# Patient Record
Sex: FEMALE | Race: WHITE | NOT HISPANIC OR LATINO | Employment: OTHER | URBAN - METROPOLITAN AREA
[De-identification: names, ages, dates, MRNs, and addresses within clinical notes are randomized per-mention and may not be internally consistent; named-entity substitution may affect disease eponyms.]

---

## 2017-10-10 ENCOUNTER — TELEPHONE (OUTPATIENT)
Dept: HEMATOLOGY/ONCOLOGY | Facility: CLINIC | Age: 62
End: 2017-10-10

## 2017-10-10 NOTE — TELEPHONE ENCOUNTER
----- Message from Shayy Alicea sent at 10/10/2017  2:07 PM CDT -----  Patient stated that CT of ABD/PEL was to be done at [facility] however they are out of network with her insurance. She said she has to have it done at [facility]. Can she please get orders for this. She had to move her appt due to insurance also going to be up 11/1/17, so I had to put her on for 10/31/17. Please advise and call patient at [phone]. Thanks

## 2017-10-20 ENCOUNTER — TELEPHONE (OUTPATIENT)
Dept: HEMATOLOGY/ONCOLOGY | Facility: CLINIC | Age: 62
End: 2017-10-20

## 2017-10-20 NOTE — TELEPHONE ENCOUNTER
Spoke to patient. I told her scan appears to be unremarkable.She will keep f/u with  on 10/31 to go over results in detail

## 2017-10-31 ENCOUNTER — OFFICE VISIT (OUTPATIENT)
Dept: HEMATOLOGY/ONCOLOGY | Facility: CLINIC | Age: 62
End: 2017-10-31
Payer: COMMERCIAL

## 2017-10-31 VITALS
HEART RATE: 74 BPM | DIASTOLIC BLOOD PRESSURE: 71 MMHG | SYSTOLIC BLOOD PRESSURE: 102 MMHG | HEIGHT: 65 IN | BODY MASS INDEX: 26.21 KG/M2 | RESPIRATION RATE: 18 BRPM | WEIGHT: 157.31 LBS | TEMPERATURE: 98 F

## 2017-10-31 DIAGNOSIS — C49.A3 MALIGNANT GASTROINTESTINAL STROMAL TUMOR (GIST) OF SMALL INTESTINE: ICD-10-CM

## 2017-10-31 PROCEDURE — 99213 OFFICE O/P EST LOW 20 MIN: CPT | Mod: ,,, | Performed by: INTERNAL MEDICINE

## 2017-10-31 RX ORDER — IVERMECTIN 10 MG/G
1 CREAM TOPICAL
COMMUNITY
End: 2019-08-06

## 2017-10-31 RX ORDER — PANTOPRAZOLE SODIUM 40 MG/1
40 TABLET, DELAYED RELEASE ORAL
COMMUNITY
End: 2024-02-05

## 2017-10-31 RX ORDER — FLUTICASONE PROPIONATE 50 MCG
1 SPRAY, SUSPENSION (ML) NASAL
COMMUNITY
End: 2022-03-09

## 2017-10-31 RX ORDER — CALCIPOTRIENE, BETAMETHASONE DIPROPIONATE 50; .643 UG/G; MG/G
OINTMENT TOPICAL
COMMUNITY
End: 2018-04-18

## 2017-10-31 RX ORDER — CALCIPOTRIENE AND BETAMETHASONE DIPROPIONATE 50; .5 UG/G; MG/G
SUSPENSION TOPICAL
COMMUNITY
End: 2019-08-06

## 2017-10-31 RX ORDER — LIFITEGRAST 50 MG/ML
SOLUTION/ DROPS OPHTHALMIC
Refills: 6 | COMMUNITY
Start: 2017-08-01 | End: 2019-08-06

## 2017-10-31 RX ORDER — CEFDINIR 300 MG/1
300 CAPSULE ORAL
COMMUNITY
End: 2018-04-18

## 2017-10-31 RX ORDER — BENZONATATE 100 MG/1
100 CAPSULE ORAL
COMMUNITY
End: 2019-08-06

## 2017-10-31 RX ORDER — MULTIVITAMIN
1 TABLET ORAL
COMMUNITY

## 2017-10-31 RX ORDER — CYCLOBENZAPRINE HCL 10 MG
10 TABLET ORAL
COMMUNITY
End: 2018-10-15

## 2017-10-31 RX ORDER — LORATADINE 10 MG/1
10 TABLET ORAL
COMMUNITY
End: 2022-06-14

## 2017-10-31 RX ORDER — ERGOCALCIFEROL 1.25 MG/1
50000 CAPSULE ORAL
Refills: 3 | COMMUNITY
Start: 2017-09-06 | End: 2019-08-06

## 2017-10-31 NOTE — PROGRESS NOTES
PROGRESS NOTE    Subjective:       Patient ID: Dai Cummings is a 61 y.o. female.    Chief Complaint:  No chief complaint on file.  follow up for GIST    History of Present Illness:   Dai Cummings is a 61 y.o. female who presents for routine follow up of GIST tumor.  Patient complaining of a 2 day history of intermittent soreness in there right flank area/RLQ.  Patient also very fatigued over the last few weeks.  No associated pain, has not tried any medications.        Family and Social history reviewed and is unchanged from 8/12/2013      ROS:  Review of Systems   Constitutional: Negative for fever.   Respiratory: Negative for shortness of breath.    Cardiovascular: Negative for chest pain and leg swelling.   Gastrointestinal: Negative for abdominal pain and blood in stool.   Genitourinary: Negative for hematuria.   Skin: Negative for rash.          Current Outpatient Prescriptions:     apremilast 30 mg Tab, Take 1 each by mouth., Disp: , Rfl:     benzonatate (TESSALON) 100 MG capsule, Take 100 mg by mouth., Disp: , Rfl:     calcipotriene-betamethasone (TACLONEX SCALP) external suspension, Apply topically., Disp: , Rfl:     calcipotriene-betamethasone (TACLONEX) ointment, Apply topically., Disp: , Rfl:     cefdinir (OMNICEF) 300 MG capsule, Take 300 mg by mouth., Disp: , Rfl:     cyclobenzaprine (FLEXERIL) 10 MG tablet, Take 10 mg by mouth., Disp: , Rfl:     cyclosporine 0.05 % Drop, 1 drop., Disp: , Rfl:     diphenhyd-PE-acetaminophen 12.5-5-325 mg Tab, Take 1 each by mouth., Disp: , Rfl:     ergocalciferol (ERGOCALCIFEROL) 50,000 unit Cap, Take 50,000 Units by mouth every 7 days., Disp: , Rfl: 3    fluticasone (FLONASE) 50 mcg/actuation nasal spray, 1 spray by Nasal route., Disp: , Rfl:     ivermectin 1 % Crea, Apply 1 each topically., Disp: , Rfl:     loratadine (CLARITIN) 10 mg tablet, Take 10 mg by mouth., Disp: , Rfl:      "loratadine-pseudoephedrine 5-120 mg (CLARITIN-D 12 HOUR) 5-120 mg per tablet, Take by mouth., Disp: , Rfl:     menthol 10 % Spry, Apply topically., Disp: , Rfl:     multivitamin (THERAGRAN) per tablet, Take 1 tablet by mouth., Disp: , Rfl:     pantoprazole (PROTONIX) 40 MG tablet, Take 40 mg by mouth., Disp: , Rfl:     POLYETHYLENE GLYCOL 3350 ORAL, Take by mouth., Disp: , Rfl:     ranitidine (ZANTAC) 300 MG tablet, Take 300 mg by mouth every evening., Disp: , Rfl: 4    XIIDRA 5 % Dpet, INSTILL 1 DROP INTO EACH EYE TWICE A DAY, Disp: , Rfl: 6        Objective:       Physical Examination:     /71   Pulse 74   Temp 97.8 °F (36.6 °C)   Resp 18   Ht 5' 5" (1.651 m)   Wt 71.4 kg (157 lb 4.8 oz)   BMI 26.18 kg/m²     Physical Exam   Constitutional: She appears well-developed and well-nourished.   HENT:   Head: Normocephalic and atraumatic.   Right Ear: External ear normal.   Left Ear: External ear normal.   Mouth/Throat: Oropharynx is clear and moist.   Eyes: Conjunctivae are normal. Pupils are equal, round, and reactive to light.   Neck: No tracheal deviation present. No thyromegaly present.   Cardiovascular: Normal rate, regular rhythm and normal heart sounds.    Pulmonary/Chest: Effort normal and breath sounds normal.   Abdominal: Soft. Bowel sounds are normal. She exhibits no distension and no mass. There is no tenderness.   Musculoskeletal: She exhibits no edema.   Neurological:   Neuro intact througout   Skin: No rash noted.   Psychiatric: She has a normal mood and affect. Her behavior is normal. Judgment and thought content normal.       Labs:   No results found for this or any previous visit (from the past 336 hour(s)).  CMP  No results found for: NA, K, CL, CO2, GLU, BUN, CREATININE, CALCIUM, PROT, ALBUMIN, BILITOT, ALKPHOS, AST, ALT, ANIONGAP, ESTGFRAFRICA, EGFRNONAA  No results found for: CEA  No results found for: PSA        Assessment/Plan:     Problem List Items Addressed This Visit     " Malignant gastrointestinal stromal tumor (GIST) of small intestine/pancreas     Patient with some abdominal symptoms but CT of the abdomen done 8/17 sees no clear abnormalities.  Would observe, may be GI related and will likely self resolve.  Patient to call if this does not occur.  Will see her again in 6 months and plan on scan again in one year.  Discussed all this in detail.                 Discussion:     Return in about 6 months (around 4/30/2018).      Electronically signed by John Díaz

## 2017-10-31 NOTE — ASSESSMENT & PLAN NOTE
Patient with some abdominal symptoms but CT of the abdomen done 8/17 sees no clear abnormalities.  Would observe, may be GI related and will likely self resolve.  Patient to call if this does not occur.  Will see her again in 6 months and plan on scan again in one year.  Discussed all this in detail.

## 2018-04-18 ENCOUNTER — OFFICE VISIT (OUTPATIENT)
Dept: HEMATOLOGY/ONCOLOGY | Facility: CLINIC | Age: 63
End: 2018-04-18
Payer: COMMERCIAL

## 2018-04-18 VITALS
RESPIRATION RATE: 18 BRPM | HEART RATE: 65 BPM | HEIGHT: 65 IN | TEMPERATURE: 98 F | WEIGHT: 168.69 LBS | DIASTOLIC BLOOD PRESSURE: 76 MMHG | SYSTOLIC BLOOD PRESSURE: 113 MMHG | BODY MASS INDEX: 28.11 KG/M2

## 2018-04-18 DIAGNOSIS — C49.A3 MALIGNANT GASTROINTESTINAL STROMAL TUMOR (GIST) OF SMALL INTESTINE: ICD-10-CM

## 2018-04-18 PROCEDURE — 99213 OFFICE O/P EST LOW 20 MIN: CPT | Mod: ,,, | Performed by: INTERNAL MEDICINE

## 2018-04-18 NOTE — PROGRESS NOTES
"                                                         PROGRESS NOTE    Subjective:       Patient ID: Dai Cummings is a 62 y.o. female.    Chief Complaint:  No chief complaint on file.  follow up for GIST    History of Present Illness:   Dai Cummings is a 62 y.o. female who presents for routine follow up of GIST tumor. Patient still has muscle pain and soreness and states that "I don't feel right".      Family and Social history reviewed and is unchanged from 8/12/2013      ROS:  Review of Systems   Constitutional: Negative for fever.   Respiratory: Negative for shortness of breath.    Cardiovascular: Negative for chest pain and leg swelling.   Gastrointestinal: Negative for abdominal pain and blood in stool.   Genitourinary: Negative for hematuria.   Skin: Negative for rash.          Current Outpatient Prescriptions:     cyclobenzaprine (FLEXERIL) 10 MG tablet, Take 10 mg by mouth., Disp: , Rfl:     fluticasone (FLONASE) 50 mcg/actuation nasal spray, 1 spray by Nasal route., Disp: , Rfl:     loratadine (CLARITIN) 10 mg tablet, Take 10 mg by mouth., Disp: , Rfl:     loratadine-pseudoephedrine 5-120 mg (CLARITIN-D 12 HOUR) 5-120 mg per tablet, Take by mouth., Disp: , Rfl:     menthol 10 % Spry, Apply topically., Disp: , Rfl:     multivitamin (THERAGRAN) per tablet, Take 1 tablet by mouth., Disp: , Rfl:     pantoprazole (PROTONIX) 40 MG tablet, Take 40 mg by mouth., Disp: , Rfl:     POLYETHYLENE GLYCOL 3350 ORAL, Take by mouth., Disp: , Rfl:     ranitidine (ZANTAC) 300 MG tablet, Take 300 mg by mouth every evening., Disp: , Rfl: 4    XIIDRA 5 % Dpet, INSTILL 1 DROP INTO EACH EYE TWICE A DAY, Disp: , Rfl: 6    benzonatate (TESSALON) 100 MG capsule, Take 100 mg by mouth., Disp: , Rfl:     calcipotriene-betamethasone (TACLONEX SCALP) external suspension, Apply topically., Disp: , Rfl:     cyclosporine 0.05 % Drop, 1 drop., Disp: , Rfl:     ergocalciferol (ERGOCALCIFEROL) 50,000 unit Cap, Take 50,000 " "Units by mouth every 7 days., Disp: , Rfl: 3    ivermectin 1 % Crea, Apply 1 each topically., Disp: , Rfl:         Objective:       Physical Examination:     /76   Pulse 65   Temp 98 °F (36.7 °C) (Oral)   Resp 18   Ht 5' 5" (1.651 m)   Wt 76.5 kg (168 lb 11.2 oz)   BMI 28.07 kg/m²     Physical Exam   Constitutional: She appears well-developed and well-nourished.   HENT:   Head: Normocephalic and atraumatic.   Right Ear: External ear normal.   Left Ear: External ear normal.   Mouth/Throat: Oropharynx is clear and moist.   Eyes: Conjunctivae are normal. Pupils are equal, round, and reactive to light.   Neck: No tracheal deviation present. No thyromegaly present.   Cardiovascular: Normal rate, regular rhythm and normal heart sounds.    Pulmonary/Chest: Effort normal and breath sounds normal.   Abdominal: Soft. Bowel sounds are normal. She exhibits no distension and no mass. There is no tenderness.   Musculoskeletal: She exhibits no edema.   Neurological:   Neuro intact througout   Skin: No rash noted.   Psychiatric: She has a normal mood and affect. Her behavior is normal. Judgment and thought content normal.       Labs:   No results found for this or any previous visit (from the past 336 hour(s)).  CMP  No results found for: NA, K, CL, CO2, GLU, BUN, CREATININE, CALCIUM, PROT, ALBUMIN, BILITOT, ALKPHOS, AST, ALT, ANIONGAP, ESTGFRAFRICA, EGFRNONAA  No results found for: CEA  No results found for: PSA        Assessment/Plan:     Problem List Items Addressed This Visit     Malignant gastrointestinal stromal tumor (GIST) of small intestine/pancreas     Patient having similar symptoms as last fall when scans were done.  I think this may be more of her fibromyalgia issues then related to recurrent GIST.  Plan is to scan in six months and I think this is appropriate.  I offered to scan now but she is ok waiting on this.  Abdominal exam is completely benign.  Discussed all this in detail.  Will plan scans for six " months.          Relevant Orders    CBC auto differential    Comprehensive metabolic panel    CT Abdomen Pelvis With Contrast    X-Ray Chest PA And Lateral          Discussion:     Follow-up in about 6 months (around 10/18/2018).      Electronically signed by John Díaz

## 2018-04-18 NOTE — ASSESSMENT & PLAN NOTE
Patient having similar symptoms as last fall when scans were done.  I think this may be more of her fibromyalgia issues then related to recurrent GIST.  Plan is to scan in six months and I think this is appropriate.  I offered to scan now but she is ok waiting on this.  Abdominal exam is completely benign.  Discussed all this in detail.  Will plan scans for six months.

## 2018-10-15 ENCOUNTER — OFFICE VISIT (OUTPATIENT)
Dept: HEMATOLOGY/ONCOLOGY | Facility: CLINIC | Age: 63
End: 2018-10-15
Payer: COMMERCIAL

## 2018-10-15 VITALS
WEIGHT: 164 LBS | BODY MASS INDEX: 27.29 KG/M2 | DIASTOLIC BLOOD PRESSURE: 66 MMHG | RESPIRATION RATE: 18 BRPM | TEMPERATURE: 98 F | SYSTOLIC BLOOD PRESSURE: 101 MMHG | HEART RATE: 73 BPM

## 2018-10-15 DIAGNOSIS — C49.A3 MALIGNANT GASTROINTESTINAL STROMAL TUMOR (GIST) OF SMALL INTESTINE: ICD-10-CM

## 2018-10-15 PROCEDURE — 3008F BODY MASS INDEX DOCD: CPT | Mod: ,,, | Performed by: INTERNAL MEDICINE

## 2018-10-15 PROCEDURE — 99213 OFFICE O/P EST LOW 20 MIN: CPT | Mod: ,,, | Performed by: INTERNAL MEDICINE

## 2018-10-15 NOTE — ASSESSMENT & PLAN NOTE
Patient is doing well at this time and appears MICHAEL.  Will arrange to have imaging done and can call the patient with these results.  Exam is negative and she has no signs or symptoms of current problems.  Will see her again in six months.

## 2018-10-15 NOTE — PROGRESS NOTES
PROGRESS NOTE    Subjective:       Patient ID: Dai Cummings is a 62 y.o. female.    Chief Complaint:  No chief complaint on file.  follow up for GIST    History of Present Illness:   Dai Cummings is a 62 y.o. female who presents for routine follow up of GIST tumor. NO new complaints at this time but forgot to get CT scans ordered.      Family and Social history reviewed and is unchanged from 8/12/2013      ROS:  Review of Systems   Constitutional: Negative for fever.   Respiratory: Negative for shortness of breath.    Cardiovascular: Negative for chest pain and leg swelling.   Gastrointestinal: Negative for abdominal pain and blood in stool.   Genitourinary: Negative for hematuria.   Skin: Negative for rash.          Current Outpatient Medications:     benzonatate (TESSALON) 100 MG capsule, Take 100 mg by mouth., Disp: , Rfl:     calcipotriene-betamethasone (TACLONEX SCALP) external suspension, Apply topically., Disp: , Rfl:     cyclosporine 0.05 % Drop, 1 drop., Disp: , Rfl:     ergocalciferol (ERGOCALCIFEROL) 50,000 unit Cap, Take 50,000 Units by mouth every 7 days., Disp: , Rfl: 3    fluticasone (FLONASE) 50 mcg/actuation nasal spray, 1 spray by Nasal route., Disp: , Rfl:     ivermectin 1 % Crea, Apply 1 each topically., Disp: , Rfl:     loratadine (CLARITIN) 10 mg tablet, Take 10 mg by mouth., Disp: , Rfl:     loratadine-pseudoephedrine 5-120 mg (CLARITIN-D 12 HOUR) 5-120 mg per tablet, Take by mouth., Disp: , Rfl:     menthol 10 % Spry, Apply topically., Disp: , Rfl:     multivitamin (THERAGRAN) per tablet, Take 1 tablet by mouth., Disp: , Rfl:     pantoprazole (PROTONIX) 40 MG tablet, Take 40 mg by mouth., Disp: , Rfl:     POLYETHYLENE GLYCOL 3350 ORAL, Take by mouth., Disp: , Rfl:     ranitidine (ZANTAC) 300 MG tablet, Take 300 mg by mouth every evening., Disp: , Rfl: 4    XIIDRA 5 % Dpet, INSTILL 1 DROP INTO EACH EYE TWICE A DAY,  Disp: , Rfl: 6        Objective:       Physical Examination:     /66   Pulse 73   Temp 98 °F (36.7 °C) (Oral)   Resp 18   Wt 74.4 kg (164 lb)   BMI 27.29 kg/m²     Physical Exam   Constitutional: She appears well-developed and well-nourished.   HENT:   Head: Normocephalic and atraumatic.   Right Ear: External ear normal.   Left Ear: External ear normal.   Mouth/Throat: Oropharynx is clear and moist.   Eyes: Conjunctivae are normal. Pupils are equal, round, and reactive to light.   Neck: No tracheal deviation present. No thyromegaly present.   Cardiovascular: Normal rate, regular rhythm and normal heart sounds.   Pulmonary/Chest: Effort normal and breath sounds normal.   Abdominal: Soft. Bowel sounds are normal. She exhibits no distension and no mass. There is no tenderness.   Musculoskeletal: She exhibits no edema.   Neurological:   Neuro intact througout   Skin: No rash noted.   Psychiatric: She has a normal mood and affect. Her behavior is normal. Judgment and thought content normal.       Labs:   No results found for this or any previous visit (from the past 336 hour(s)).  CMP  No results found for: NA, K, CL, CO2, GLU, BUN, CREATININE, CALCIUM, PROT, ALBUMIN, BILITOT, ALKPHOS, AST, ALT, ANIONGAP, ESTGFRAFRICA, EGFRNONAA  No results found for: CEA  No results found for: PSA        Assessment/Plan:     Problem List Items Addressed This Visit     Malignant gastrointestinal stromal tumor (GIST) of small intestine/pancreas     Patient is doing well at this time and appears MICHAEL.  Will arrange to have imaging done and can call the patient with these results.  Exam is negative and she has no signs or symptoms of current problems.  Will see her again in six months.                 Discussion:     Follow-up in about 6 months (around 4/15/2019).      Electronically signed by John íDaz

## 2018-11-08 LAB — ISTAT CREATININE: 0.5 MG/DL (ref 0.6–1.4)

## 2019-08-06 ENCOUNTER — OFFICE VISIT (OUTPATIENT)
Dept: HEMATOLOGY/ONCOLOGY | Facility: CLINIC | Age: 64
End: 2019-08-06
Payer: COMMERCIAL

## 2019-08-06 DIAGNOSIS — C49.A3 MALIGNANT GASTROINTESTINAL STROMAL TUMOR (GIST) OF SMALL INTESTINE: ICD-10-CM

## 2019-08-06 DIAGNOSIS — F51.01 PRIMARY INSOMNIA: ICD-10-CM

## 2019-08-06 PROCEDURE — 99213 OFFICE O/P EST LOW 20 MIN: CPT | Mod: S$GLB,,, | Performed by: INTERNAL MEDICINE

## 2019-08-06 PROCEDURE — 99213 PR OFFICE/OUTPT VISIT, EST, LEVL III, 20-29 MIN: ICD-10-PCS | Mod: S$GLB,,, | Performed by: INTERNAL MEDICINE

## 2019-08-06 RX ORDER — ZOLPIDEM TARTRATE 5 MG/1
5 TABLET ORAL NIGHTLY PRN
Qty: 30 TABLET | Refills: 3 | Status: SHIPPED | OUTPATIENT
Start: 2019-08-06 | End: 2019-12-03 | Stop reason: SDUPTHER

## 2019-08-06 RX ORDER — ACETAMINOPHEN AND PHENYLEPHRINE HCL 325; 5 MG/1; MG/1
TABLET ORAL
COMMUNITY

## 2019-08-06 RX ORDER — CALCIUM CARBONATE 600 MG
600 TABLET ORAL ONCE
COMMUNITY

## 2019-08-06 NOTE — PROGRESS NOTES
PROGRESS NOTE    Subjective:       Patient ID: Dai Cummings is a 63 y.o. female.    Chief Complaint:  No chief complaint on file.  follow up for GIST    History of Present Illness:   Dai Cummings is a 63 y.o. female who presents for routine follow up of GIST tumor.           CT abd/p 11/8/2018 impression:  IMPRESSION:  1. Stable 3 cm enhancing mass with internal calcification adjacent to the  pancreas, compatible with known gastrointestinal stromal tumor.  2. No findings of metastatic disease in the abdomen or the pelvis.  3. Left mid abdominal small bowel intussusception, which is very likely  transient and of doubtful clinical significance.  4. Additional stable observations as above.      Family and Social history reviewed and is unchanged from 8/12/2013      ROS:  Review of Systems   Constitutional: Negative for fever.   Respiratory: Negative for shortness of breath.    Cardiovascular: Negative for chest pain and leg swelling.   Gastrointestinal: Negative for abdominal pain and blood in stool.   Genitourinary: Negative for hematuria.   Skin: Negative for rash.          Current Outpatient Medications:     biotin 10,000 mcg Cap, Take by mouth., Disp: , Rfl:     calcium carbonate (OS-SHAYAN) 600 mg calcium (1,500 mg) Tab, Take 600 mg by mouth once., Disp: , Rfl:     cholecalciferol, vitamin D3, (VITAMIN D3 ORAL), Take by mouth., Disp: , Rfl:     fluticasone (FLONASE) 50 mcg/actuation nasal spray, 1 spray by Nasal route., Disp: , Rfl:     loratadine (CLARITIN) 10 mg tablet, Take 10 mg by mouth., Disp: , Rfl:     loratadine-pseudoephedrine 5-120 mg (CLARITIN-D 12 HOUR) 5-120 mg per tablet, Take by mouth., Disp: , Rfl:     multivitamin (THERAGRAN) per tablet, Take 1 tablet by mouth., Disp: , Rfl:     pantoprazole (PROTONIX) 40 MG tablet, Take 40 mg by mouth., Disp: , Rfl:     polyethylene glycol 3350 (MIRALAX ORAL), Take by mouth., Disp: , Rfl:      ranitidine (ZANTAC) 300 MG tablet, Take 300 mg by mouth every evening., Disp: , Rfl: 4    zolpidem (AMBIEN) 5 MG Tab, Take 1 tablet (5 mg total) by mouth nightly as needed., Disp: 30 tablet, Rfl: 3        Objective:       Physical Examination:     There were no vitals taken for this visit.    Physical Exam   Constitutional: She appears well-developed and well-nourished.   HENT:   Head: Normocephalic and atraumatic.   Right Ear: External ear normal.   Left Ear: External ear normal.   Mouth/Throat: Oropharynx is clear and moist.   Eyes: Pupils are equal, round, and reactive to light. Conjunctivae are normal.   Neck: No tracheal deviation present. No thyromegaly present.   Cardiovascular: Normal rate, regular rhythm and normal heart sounds.   Pulmonary/Chest: Effort normal and breath sounds normal.   Abdominal: Soft. Bowel sounds are normal. She exhibits no distension and no mass. There is no tenderness.   Musculoskeletal: She exhibits no edema.   Neurological:   Neuro intact througout   Skin: No rash noted.   Psychiatric: She has a normal mood and affect. Her behavior is normal. Judgment and thought content normal.       Labs:   No results found for this or any previous visit (from the past 336 hour(s)).  CMP  No results found for: NA, K, CL, CO2, GLU, BUN, CREATININE, CALCIUM, PROT, ALBUMIN, BILITOT, ALKPHOS, AST, ALT, ANIONGAP, ESTGFRAFRICA, EGFRNONAA  No results found for: CEA  No results found for: PSA        Assessment/Plan:     Problem List Items Addressed This Visit     Primary insomnia     Patient having more difficulties with this.  Will try her on Ambien and see how this helps.           Relevant Medications    zolpidem (AMBIEN) 5 MG Tab    Malignant gastrointestinal stromal tumor (GIST) of small intestine/pancreas     Patient has no clear evidence of recurrence at this point but still has rather severe RUQ pain and bowel issues since the surgery in 2011.  She has seen GI but I have no notes from them.  My  plan is to get another scan in the next few months to investigate this issue and for new disease and discussed this today.           Relevant Orders    CT Abdomen Pelvis With Contrast    Creatinine, serum          Discussion:     Follow up in about 3 months (around 11/6/2019).      Electronically signed by John Díaz

## 2019-08-06 NOTE — ASSESSMENT & PLAN NOTE
Patient has no clear evidence of recurrence at this point but still has rather severe RUQ pain and bowel issues since the surgery in 2011.  She has seen GI but I have no notes from them.  My plan is to get another scan in the next few months to investigate this issue and for new disease and discussed this today.

## 2019-11-01 ENCOUNTER — HOSPITAL ENCOUNTER (OUTPATIENT)
Dept: RADIOLOGY | Facility: HOSPITAL | Age: 64
Discharge: HOME | End: 2019-11-01
Attending: INTERNAL MEDICINE
Payer: COMMERCIAL

## 2019-11-01 DIAGNOSIS — C49.A3 MALIGNANT GASTROINTESTINAL STROMAL TUMOR (GIST) OF SMALL INTESTINE: ICD-10-CM

## 2019-11-01 LAB
CREAT SERPL-MCNC: 0.5 MG/DL (ref 0.5–1.4)
SAMPLE: NORMAL

## 2019-11-01 PROCEDURE — 25500020 PHARM REV CODE 255: Performed by: INTERNAL MEDICINE

## 2019-11-01 PROCEDURE — 74178 CT ABD&PLV WO CNTR FLWD CNTR: CPT | Mod: TC

## 2019-11-01 RX ADMIN — IOHEXOL 100 ML: 350 INJECTION, SOLUTION INTRAVENOUS at 01:11

## 2019-11-05 ENCOUNTER — OFFICE VISIT (OUTPATIENT)
Dept: HEMATOLOGY/ONCOLOGY | Facility: CLINIC | Age: 64
End: 2019-11-05
Payer: COMMERCIAL

## 2019-11-05 VITALS
TEMPERATURE: 98 F | RESPIRATION RATE: 19 BRPM | WEIGHT: 163.38 LBS | HEART RATE: 80 BPM | BODY MASS INDEX: 27.19 KG/M2 | SYSTOLIC BLOOD PRESSURE: 112 MMHG | DIASTOLIC BLOOD PRESSURE: 76 MMHG

## 2019-11-05 DIAGNOSIS — C49.A3 MALIGNANT GASTROINTESTINAL STROMAL TUMOR (GIST) OF SMALL INTESTINE: ICD-10-CM

## 2019-11-05 PROCEDURE — 3008F BODY MASS INDEX DOCD: CPT | Mod: S$GLB,,, | Performed by: INTERNAL MEDICINE

## 2019-11-05 PROCEDURE — 3008F PR BODY MASS INDEX (BMI) DOCUMENTED: ICD-10-PCS | Mod: S$GLB,,, | Performed by: INTERNAL MEDICINE

## 2019-11-05 PROCEDURE — 99213 PR OFFICE/OUTPT VISIT, EST, LEVL III, 20-29 MIN: ICD-10-PCS | Mod: S$GLB,,, | Performed by: INTERNAL MEDICINE

## 2019-11-05 PROCEDURE — 99213 OFFICE O/P EST LOW 20 MIN: CPT | Mod: S$GLB,,, | Performed by: INTERNAL MEDICINE

## 2019-11-05 NOTE — ASSESSMENT & PLAN NOTE
Patient returns with another CT in the abdomen.  The CT scan shows a 3cm mass in the head of the pancreas.  This is unchanged from last year and I discussed this is unlike a cancer Dx and may be more likely a scarring issue.  I will have her see Dr. Arroyo to see if EUS is a reasonable approach here and discussed this today.  Will have her back with me after this procedure is done.

## 2019-11-05 NOTE — PROGRESS NOTES
PROGRESS NOTE    Subjective:       Patient ID: Dai Cummings is a 63 y.o. female.    Chief Complaint:  No chief complaint on file.  follow up for GIST    History of Present Illness:   Dai Cummings is a 63 y.o. female who presents for routine follow up of GIST tumor.       Ct Abd/P Impression 11/1/2019:  1. Stable size and appearance of 3 cm mass along the anterior aspect of the pancreatic head, consistent with known gastrointestinal stromal tumor.  2. No findings of metastatic disease in the abdomen or pelvis.      CT abd/p 11/8/2018 impression:  IMPRESSION:  1. Stable 3 cm enhancing mass with internal calcification adjacent to the  pancreas, compatible with known gastrointestinal stromal tumor.  2. No findings of metastatic disease in the abdomen or the pelvis.  3. Left mid abdominal small bowel intussusception, which is very likely  transient and of doubtful clinical significance.  4. Additional stable observations as above.      Family and Social history reviewed and is unchanged from 8/12/2013      ROS:  Review of Systems   Constitutional: Negative for appetite change, fever and unexpected weight change.   Eyes: Negative for visual disturbance.   Respiratory: Negative for cough and shortness of breath.    Cardiovascular: Negative for chest pain and leg swelling.   Gastrointestinal: Negative for abdominal pain, blood in stool and diarrhea.   Genitourinary: Negative for frequency and hematuria.   Musculoskeletal: Positive for back pain.   Skin: Negative for rash.   Neurological: Negative for headaches.   Hematological: Negative for adenopathy.   Psychiatric/Behavioral: The patient is not nervous/anxious.           Current Outpatient Medications:     biotin 10,000 mcg Cap, Take by mouth., Disp: , Rfl:     calcium carbonate (OS-SHAYAN) 600 mg calcium (1,500 mg) Tab, Take 600 mg by mouth once., Disp: , Rfl:     cholecalciferol, vitamin D3, (VITAMIN D3  ORAL), Take by mouth., Disp: , Rfl:     fluticasone (FLONASE) 50 mcg/actuation nasal spray, 1 spray by Nasal route., Disp: , Rfl:     loratadine (CLARITIN) 10 mg tablet, Take 10 mg by mouth., Disp: , Rfl:     loratadine-pseudoephedrine 5-120 mg (CLARITIN-D 12 HOUR) 5-120 mg per tablet, Take by mouth., Disp: , Rfl:     multivitamin (THERAGRAN) per tablet, Take 1 tablet by mouth., Disp: , Rfl:     pantoprazole (PROTONIX) 40 MG tablet, Take 40 mg by mouth., Disp: , Rfl:     polyethylene glycol 3350 (MIRALAX ORAL), Take by mouth., Disp: , Rfl:     ranitidine (ZANTAC) 300 MG tablet, Take 300 mg by mouth every evening., Disp: , Rfl: 4    zolpidem (AMBIEN) 5 MG Tab, Take 1 tablet (5 mg total) by mouth nightly as needed., Disp: 30 tablet, Rfl: 3        Objective:       Physical Examination:     /76   Pulse 80   Temp 98.2 °F (36.8 °C) (Oral)   Resp 19   Wt 74.1 kg (163 lb 6.4 oz)   BMI 27.19 kg/m²     Physical Exam   Constitutional: She appears well-developed and well-nourished.   HENT:   Head: Normocephalic and atraumatic.   Right Ear: External ear normal.   Left Ear: External ear normal.   Mouth/Throat: Oropharynx is clear and moist.   Eyes: Pupils are equal, round, and reactive to light. Conjunctivae are normal.   Neck: No tracheal deviation present. No thyromegaly present.   Cardiovascular: Normal rate, regular rhythm and normal heart sounds.   Pulmonary/Chest: Effort normal and breath sounds normal.   Abdominal: Soft. Bowel sounds are normal. She exhibits no distension and no mass. There is no tenderness.   Musculoskeletal: She exhibits no edema.   Neurological:   Neuro intact througout   Skin: No rash noted.   Psychiatric: She has a normal mood and affect. Her behavior is normal. Judgment and thought content normal.       Labs:   No results found for this or any previous visit (from the past 336 hour(s)).  CMP  No results found for: NA, K, CL, CO2, GLU, BUN, CREATININE, CALCIUM, PROT, ALBUMIN,  BILITOT, ALKPHOS, AST, ALT, ANIONGAP, ESTGFRAFRICA, EGFRNONAA  No results found for: CEA  No results found for: PSA        Assessment/Plan:     Problem List Items Addressed This Visit     Malignant gastrointestinal stromal tumor (GIST) of small intestine/pancreas     Patient returns with another CT in the abdomen.  The CT scan shows a 3cm mass in the head of the pancreas.  This is unchanged from last year and I discussed this is unlike a cancer Dx and may be more likely a scarring issue.  I will have her see Dr. Arroyo to see if EUS is a reasonable approach here and discussed this today.  Will have her back with me after this procedure is done.                Discussion:     Follow up in about 4 weeks (around 12/3/2019).      Electronically signed by John Díaz

## 2019-11-07 ENCOUNTER — HOSPITAL ENCOUNTER (OUTPATIENT)
Dept: RADIOLOGY | Facility: HOSPITAL | Age: 64
Discharge: HOME | End: 2019-11-07
Attending: PHYSICIAN ASSISTANT
Payer: COMMERCIAL

## 2019-11-07 DIAGNOSIS — M54.16 LUMBAR RADICULOPATHY: Primary | ICD-10-CM

## 2019-11-07 DIAGNOSIS — M54.16 LUMBAR RADICULOPATHY: ICD-10-CM

## 2019-11-07 PROCEDURE — 72148 MRI LUMBAR SPINE W/O DYE: CPT | Mod: TC,PO

## 2019-12-03 ENCOUNTER — HOSPITAL ENCOUNTER (OUTPATIENT)
Facility: HOSPITAL | Age: 64
Discharge: HOME | End: 2019-12-03
Attending: INTERNAL MEDICINE | Admitting: INTERNAL MEDICINE
Payer: COMMERCIAL

## 2019-12-03 ENCOUNTER — ANESTHESIA (OUTPATIENT)
Dept: SURGERY | Facility: HOSPITAL | Age: 64
End: 2019-12-03
Payer: COMMERCIAL

## 2019-12-03 ENCOUNTER — ANESTHESIA EVENT (OUTPATIENT)
Dept: SURGERY | Facility: HOSPITAL | Age: 64
End: 2019-12-03
Payer: COMMERCIAL

## 2019-12-03 VITALS
WEIGHT: 158 LBS | HEIGHT: 65 IN | HEART RATE: 63 BPM | RESPIRATION RATE: 18 BRPM | TEMPERATURE: 98 F | OXYGEN SATURATION: 100 % | DIASTOLIC BLOOD PRESSURE: 81 MMHG | BODY MASS INDEX: 26.33 KG/M2 | SYSTOLIC BLOOD PRESSURE: 118 MMHG

## 2019-12-03 DIAGNOSIS — K86.89 PANCREATIC MASS: ICD-10-CM

## 2019-12-03 DIAGNOSIS — F51.01 PRIMARY INSOMNIA: ICD-10-CM

## 2019-12-03 DIAGNOSIS — Z01.818 PREOP TESTING: ICD-10-CM

## 2019-12-03 LAB
ANION GAP SERPL CALC-SCNC: 7 MMOL/L (ref 8–16)
BASOPHILS # BLD AUTO: 0.07 K/UL (ref 0–0.2)
BASOPHILS NFR BLD: 1 % (ref 0–1.9)
BUN SERPL-MCNC: 15 MG/DL (ref 8–23)
CALCIUM SERPL-MCNC: 8.8 MG/DL (ref 8.7–10.5)
CHLORIDE SERPL-SCNC: 105 MMOL/L (ref 95–110)
CO2 SERPL-SCNC: 28 MMOL/L (ref 23–29)
CREAT SERPL-MCNC: 0.5 MG/DL (ref 0.5–1.4)
DIFFERENTIAL METHOD: ABNORMAL
EOSINOPHIL # BLD AUTO: 0.2 K/UL (ref 0–0.5)
EOSINOPHIL NFR BLD: 2.5 % (ref 0–8)
ERYTHROCYTE [DISTWIDTH] IN BLOOD BY AUTOMATED COUNT: 12.7 % (ref 11.5–14.5)
EST. GFR  (AFRICAN AMERICAN): >60 ML/MIN/1.73 M^2
EST. GFR  (NON AFRICAN AMERICAN): >60 ML/MIN/1.73 M^2
GLUCOSE SERPL-MCNC: 89 MG/DL (ref 70–110)
HCT VFR BLD AUTO: 36.4 % (ref 37–48.5)
HGB BLD-MCNC: 11.9 G/DL (ref 12–16)
IMM GRANULOCYTES # BLD AUTO: 0.02 K/UL (ref 0–0.04)
IMM GRANULOCYTES NFR BLD AUTO: 0.3 % (ref 0–0.5)
LYMPHOCYTES # BLD AUTO: 1.1 K/UL (ref 1–4.8)
LYMPHOCYTES NFR BLD: 15.5 % (ref 18–48)
MCH RBC QN AUTO: 30.1 PG (ref 27–31)
MCHC RBC AUTO-ENTMCNC: 32.7 G/DL (ref 32–36)
MCV RBC AUTO: 92 FL (ref 82–98)
MONOCYTES # BLD AUTO: 0.5 K/UL (ref 0.3–1)
MONOCYTES NFR BLD: 7.1 % (ref 4–15)
NEUTROPHILS # BLD AUTO: 5.1 K/UL (ref 1.8–7.7)
NEUTROPHILS NFR BLD: 73.6 % (ref 38–73)
NRBC BLD-RTO: 0 /100 WBC
PLATELET # BLD AUTO: 300 K/UL (ref 150–350)
PMV BLD AUTO: 10.8 FL (ref 9.2–12.9)
POTASSIUM SERPL-SCNC: 3.8 MMOL/L (ref 3.5–5.1)
RBC # BLD AUTO: 3.95 M/UL (ref 4–5.4)
SODIUM SERPL-SCNC: 140 MMOL/L (ref 136–145)
WBC # BLD AUTO: 6.92 K/UL (ref 3.9–12.7)

## 2019-12-03 PROCEDURE — S0028 INJECTION, FAMOTIDINE, 20 MG: HCPCS | Performed by: STUDENT IN AN ORGANIZED HEALTH CARE EDUCATION/TRAINING PROGRAM

## 2019-12-03 PROCEDURE — 37000009 HC ANESTHESIA EA ADD 15 MINS: Performed by: INTERNAL MEDICINE

## 2019-12-03 PROCEDURE — 63600175 PHARM REV CODE 636 W HCPCS: Performed by: NURSE ANESTHETIST, CERTIFIED REGISTERED

## 2019-12-03 PROCEDURE — 80048 BASIC METABOLIC PNL TOTAL CA: CPT

## 2019-12-03 PROCEDURE — 43237 ENDOSCOPIC US EXAM ESOPH: CPT | Performed by: INTERNAL MEDICINE

## 2019-12-03 PROCEDURE — 85025 COMPLETE CBC W/AUTO DIFF WBC: CPT

## 2019-12-03 PROCEDURE — 25000003 PHARM REV CODE 250: Performed by: STUDENT IN AN ORGANIZED HEALTH CARE EDUCATION/TRAINING PROGRAM

## 2019-12-03 PROCEDURE — 37000008 HC ANESTHESIA 1ST 15 MINUTES: Performed by: INTERNAL MEDICINE

## 2019-12-03 RX ORDER — PROPOFOL 10 MG/ML
VIAL (ML) INTRAVENOUS
Status: DISCONTINUED | OUTPATIENT
Start: 2019-12-03 | End: 2019-12-03

## 2019-12-03 RX ORDER — FAMOTIDINE 10 MG/ML
20 INJECTION INTRAVENOUS 2 TIMES DAILY
Status: DISCONTINUED | OUTPATIENT
Start: 2019-12-03 | End: 2019-12-03 | Stop reason: HOSPADM

## 2019-12-03 RX ORDER — SODIUM CHLORIDE 9 MG/ML
INJECTION, SOLUTION INTRAVENOUS CONTINUOUS
Status: DISCONTINUED | OUTPATIENT
Start: 2019-12-03 | End: 2019-12-03 | Stop reason: HOSPADM

## 2019-12-03 RX ORDER — SODIUM CHLORIDE 9 MG/ML
INJECTION, SOLUTION INTRAVENOUS CONTINUOUS PRN
Status: DISCONTINUED | OUTPATIENT
Start: 2019-12-03 | End: 2019-12-03

## 2019-12-03 RX ORDER — ZOLPIDEM TARTRATE 5 MG/1
TABLET ORAL
Qty: 30 TABLET | Refills: 0 | Status: SHIPPED | OUTPATIENT
Start: 2019-12-03 | End: 2020-02-11 | Stop reason: SDUPTHER

## 2019-12-03 RX ORDER — SODIUM CHLORIDE 0.9 % (FLUSH) 0.9 %
2 SYRINGE (ML) INJECTION
Status: DISCONTINUED | OUTPATIENT
Start: 2019-12-03 | End: 2019-12-03 | Stop reason: HOSPADM

## 2019-12-03 RX ORDER — CYCLOSPORINE 0.5 MG/ML
1 EMULSION OPHTHALMIC 2 TIMES DAILY
COMMUNITY

## 2019-12-03 RX ADMIN — PROPOFOL 20 MG: 10 INJECTION, EMULSION INTRAVENOUS at 10:12

## 2019-12-03 RX ADMIN — PROPOFOL 50 MG: 10 INJECTION, EMULSION INTRAVENOUS at 10:12

## 2019-12-03 RX ADMIN — PROPOFOL 30 MG: 10 INJECTION, EMULSION INTRAVENOUS at 10:12

## 2019-12-03 RX ADMIN — PROPOFOL 70 MG: 10 INJECTION, EMULSION INTRAVENOUS at 10:12

## 2019-12-03 RX ADMIN — SODIUM CHLORIDE: 900 INJECTION INTRAVENOUS at 10:12

## 2019-12-03 RX ADMIN — PROPOFOL 40 MG: 10 INJECTION, EMULSION INTRAVENOUS at 10:12

## 2019-12-03 RX ADMIN — FAMOTIDINE 20 MG: 10 INJECTION, SOLUTION INTRAVENOUS at 10:12

## 2019-12-03 NOTE — TRANSFER OF CARE
"Anesthesia Transfer of Care Note    Patient: Dai Cummings    Procedure(s) Performed: Procedure(s) (LRB):  ULTRASOUND, UPPER GI TRACT, ENDOSCOPIC (N/A)    Patient location: GI    Anesthesia Type: MAC    Post pain: adequate analgesia    Post assessment: no apparent anesthetic complications    Post vital signs: stable    Level of consciousness: awake and alert    Nausea/Vomiting: no nausea/vomiting    Complications: none    Transfer of care protocol was followed      Last vitals:   Visit Vitals  /74 (BP Location: Left arm, Patient Position: Lying)   Pulse 75   Temp 36.6 °C (97.9 °F) (Oral)   Resp 15   Ht 5' 5" (1.651 m)   Wt 71.7 kg (158 lb)   SpO2 100%   Breastfeeding? No   BMI 26.29 kg/m²     "

## 2019-12-03 NOTE — [employer]OVATION PATIENT INSTRUCTIONS
Discharge Summary/Instructions after an Endoscopic Procedure  Patient Name: Dai Cummings  Patient MRN: [id number]  Patient YOB: 1955 Tuesday, December 03, 2019  Miguel Ángel Arroyo III, MD  RESTRICTIONS:  During your procedure today, you received medications for sedation.  These   medications may affect your judgment, balance and coordination.  Therefore,   for 24 hours, you have the following restrictions:   - DO NOT drive a car, operate machinery, make legal/financial decisions,   sign important papers or drink alcohol.    ACTIVITY:  Today: no heavy lifting, straining or running due to procedural   sedation/anesthesia.  The following day: return to full activity including work.  DIET:  Eat and drink normally unless instructed otherwise.     TREATMENT FOR COMMON SIDE EFFECTS:  - Mild abdominal pain, nausea, belching, bloating or excessive gas:  rest,   eat lightly and use a heating pad.  - Sore Throat: treat with throat lozenges and/or gargle with warm salt   water.  - Because air was used during the procedure, expelling large amounts of air   from your rectum or belching is normal.  - If a bowel prep was taken, you may not have a bowel movement for 1-3 days.    This is normal.  SYMPTOMS TO WATCH FOR AND REPORT TO YOUR PHYSICIAN:  1. Abdominal pain or bloating, other than gas cramps.  2. Chest pain.  3. Back pain.  4. Signs of infection such as: chills or fever occurring within 24 hours   after the procedure.  5. Rectal bleeding, which would show as bright red, maroon, or black stools.   (A tablespoon of blood from the rectum is not serious, especially if   hemorrhoids are present.)  6. Vomiting.  7. Weakness or dizziness.  GO DIRECTLY TO THE NEAREST EMERGENCY ROOM IF YOU HAVE ANY OF THE FOLLOWING:      Difficulty breathing              Chills and/or fever over 101 F   Persistent vomiting and/or vomiting blood   Severe abdominal pain   Severe chest pain   Black, tarry stools   Bleeding- more than one  tablespoon   Any other symptom or condition that you feel may need urgent attention  Your doctor recommends these additional instructions:  If any biopsies were taken, your doctors clinic will contact you in 1 to 2   weeks with any results.  - Discharge patient to home.   - Patient has a contact number available for emergencies.  The signs and   symptoms of potential delayed complications were discussed with the   patient.  Return to normal activities tomorrow.  Written discharge   instructions were provided to the patient.   - Resume regular diet.   - Continue present medications.   - Recommend surveillance per oncology discretion; if tissue absolutely   needed, would need IR vs surgical approach.  For questions, problems or results please call your physician - Miguel Ángel Arroyo III, MD at Work:  [phone].  [facility], EMERGENCY ROOM PHONE NUMBER: [phone]  IF A COMPLICATION OR EMERGENCY SITUATION ARISES AND YOU ARE UNABLE TO REACH   YOUR PHYSICIAN - GO DIRECTLY TO THE EMERGENCY ROOM.  Miguel Ángel Arroyo III, MD  12/3/2019 10:57:32 AM  This report has been verified and signed electronically.  [employer]

## 2019-12-03 NOTE — ANESTHESIA PREPROCEDURE EVALUATION
12/03/2019  Dai Cummings is a 63 y.o., female   Patient Active Problem List   Diagnosis    Malignant gastrointestinal stromal tumor (GIST) of small intestine/pancreas    Primary insomnia    Pancreatic mass       Past Surgical History:   Procedure Laterality Date    APPENDECTOMY      CATARACT EXTRACTION      both    CHOLECYSTECTOMY      DILATION AND CURETTAGE OF UTERUS      GASTRIC BYPASS      HERNIA REPAIR      HYSTERECTOMY      LAPAROSCOPIC GASTRIC BANDING      TONSILLECTOMY      WHIPPLE PROCEDURE          Tobacco Use:  The patient  reports that she has never smoked. She has never used smokeless tobacco.     No results found for this or any previous visit.          Lab Results   Component Value Date    WBC 6.92 12/03/2019    HGB 11.9 (L) 12/03/2019    HCT 36.4 (L) 12/03/2019    MCV 92 12/03/2019     12/03/2019     BMP  No results found for: NA, K, CL, CO2, BUN, CREATININE, CALCIUM, ANIONGAP, ESTGFRAFRICA, EGFRNONAA          Pre-op Assessment    I have reviewed the Patient Summary Reports.     I have reviewed the Nursing Notes.   I have reviewed the Medications.     Review of Systems  Anesthesia Hx:  No problems with previous Anesthesia  Denies Family Hx of Anesthesia complications.   Denies Personal Hx of Anesthesia complications.   Social:  Non-Smoker, No Alcohol Use    EENT/Dental:EENT/Dental Normal   Cardiovascular:  Cardiovascular Normal Exercise tolerance: good   Functional Capacity good / => 4 METS    Pulmonary:  Pulmonary Normal    Renal/:  Renal/ Normal     Hepatic/GI:   PUD, GERD GIST tumor, pancreatic mass   Neurological:  Neurology Normal    Endocrine:  Endocrine Normal        Physical Exam  General:  Well nourished    Airway/Jaw/Neck:  Airway Findings: Mouth Opening: Normal Mallampati: II  TM Distance: Normal, at least 6 cm  Jaw/Neck Findings:  Neck ROM: Normal ROM       Dental:  Dental Findings: In tact   Chest/Lungs:  Chest/Lungs Findings: Clear to auscultation, Normal Respiratory Rate     Heart/Vascular:  Heart Findings: Rate: Normal  Rhythm: Regular Rhythm  Sounds: Normal        Mental Status:  Mental Status Findings:  Cooperative, Alert and Oriented         Anesthesia Plan  Type of Anesthesia, risks & benefits discussed:  Anesthesia Type:  MAC  Patient's Preference:   Intra-op Monitoring Plan: standard ASA monitors  Intra-op Monitoring Plan Comments:   Post Op Pain Control Plan: per primary service following discharge from PACU  Post Op Pain Control Plan Comments:   Induction:    Beta Blocker:         Informed Consent: Patient understands risks and agrees with Anesthesia plan.  Questions answered. Anesthesia consent signed with patient.  ASA Score: 2     Day of Surgery Review of History & Physical:    H&P update referred to the surgeon.     Anesthesia Plan Notes: MAC with propofol. Pepcid.

## 2019-12-03 NOTE — ANESTHESIA POSTPROCEDURE EVALUATION
Anesthesia Post Evaluation    Patient: Dai Cummings    Procedure(s) Performed: Procedure(s) (LRB):  ULTRASOUND, UPPER GI TRACT, ENDOSCOPIC (N/A)    Final Anesthesia Type: MAC    Patient location during evaluation: GI PACU  Patient participation: Yes- Able to Participate  Level of consciousness: awake and alert and oriented  Post-procedure vital signs: reviewed and stable  Pain management: adequate  Airway patency: patent    PONV status at discharge: No PONV  Anesthetic complications: no      Cardiovascular status: blood pressure returned to baseline and hemodynamically stable  Respiratory status: unassisted, spontaneous ventilation and room air  Hydration status: euvolemic  Follow-up not needed.          Vitals Value Taken Time   /74 12/3/2019 10:52 AM   Temp 36.6 °C (97.9 °F) 12/3/2019 10:52 AM   Pulse 75 12/3/2019 10:52 AM   Resp 15 12/3/2019 10:52 AM   SpO2 100 % 12/3/2019 10:52 AM         No case tracking events are documented in the log.      Pain/Matthieu Score: No data recorded

## 2019-12-10 ENCOUNTER — OFFICE VISIT (OUTPATIENT)
Dept: HEMATOLOGY/ONCOLOGY | Facility: CLINIC | Age: 64
End: 2019-12-10
Payer: COMMERCIAL

## 2019-12-10 VITALS
TEMPERATURE: 98 F | WEIGHT: 163.63 LBS | DIASTOLIC BLOOD PRESSURE: 72 MMHG | RESPIRATION RATE: 18 BRPM | BODY MASS INDEX: 27.22 KG/M2 | SYSTOLIC BLOOD PRESSURE: 120 MMHG | HEART RATE: 90 BPM

## 2019-12-10 DIAGNOSIS — C49.A3 MALIGNANT GASTROINTESTINAL STROMAL TUMOR (GIST) OF SMALL INTESTINE: ICD-10-CM

## 2019-12-10 DIAGNOSIS — K86.89 PANCREATIC MASS: ICD-10-CM

## 2019-12-10 PROCEDURE — 99214 OFFICE O/P EST MOD 30 MIN: CPT | Mod: S$GLB,,, | Performed by: INTERNAL MEDICINE

## 2019-12-10 PROCEDURE — 99214 PR OFFICE/OUTPT VISIT, EST, LEVL IV, 30-39 MIN: ICD-10-PCS | Mod: S$GLB,,, | Performed by: INTERNAL MEDICINE

## 2019-12-10 PROCEDURE — 3008F BODY MASS INDEX DOCD: CPT | Mod: S$GLB,,, | Performed by: INTERNAL MEDICINE

## 2019-12-10 PROCEDURE — 3008F PR BODY MASS INDEX (BMI) DOCUMENTED: ICD-10-PCS | Mod: S$GLB,,, | Performed by: INTERNAL MEDICINE

## 2019-12-10 NOTE — ASSESSMENT & PLAN NOTE
Patient has had EUS and Dr. Arroyo was unable to visualize the lesion so no biopsies were taken.  At this point I feel that continued observation is reasonable but will discuss this with IR to see if this is amenable to needle biopsy via ct guidance.  Will also discuss whether there are other imaging modalities that could be helpful here.  Patient adamantly opposed to surgery/open biopsy.  Will notify her of my findings and plan.

## 2019-12-10 NOTE — PROGRESS NOTES
PROGRESS NOTE    Subjective:       Patient ID: Dai Cummings is a 64 y.o. female.    Chief Complaint:  No chief complaint on file.  follow up for GIST    History of Present Illness:   Dai Cummings is a 64 y.o. female who presents for routine follow up of GIST tumor.     EUS 12/3/2019:  There was no sign of significant endosonographic abnormality in the        neck / body / tail pancreas. The pancreatic duct was regular in        contour.       Lesion of interest on CT could not be identified on EUS due to        gastric sleeve and also loop of bowel located between gastric lumen        and area of interest. Attempted EUS from transjejunal views also        unsuccessful.      Ct Abd/P Impression 11/1/2019:  1. Stable size and appearance of 3 cm mass along the anterior aspect of the pancreatic head, consistent with known gastrointestinal stromal tumor.  2. No findings of metastatic disease in the abdomen or pelvis.      CT abd/p 11/8/2018 impression:  IMPRESSION:  1. Stable 3 cm enhancing mass with internal calcification adjacent to the  pancreas, compatible with known gastrointestinal stromal tumor.  2. No findings of metastatic disease in the abdomen or the pelvis.  3. Left mid abdominal small bowel intussusception, which is very likely  transient and of doubtful clinical significance.  4. Additional stable observations as above.      Family and Social history reviewed and is unchanged from 8/12/2013      ROS:  Review of Systems   Constitutional: Negative for appetite change, fever and unexpected weight change.   Eyes: Negative for visual disturbance.   Respiratory: Negative for cough and shortness of breath.    Cardiovascular: Negative for chest pain and leg swelling.   Gastrointestinal: Negative for abdominal pain, blood in stool and diarrhea.   Genitourinary: Negative for frequency and hematuria.   Musculoskeletal: Positive for back pain.   Skin:  Negative for rash.   Neurological: Negative for headaches.   Hematological: Negative for adenopathy.   Psychiatric/Behavioral: The patient is not nervous/anxious.           Current Outpatient Medications:     biotin 10,000 mcg Cap, Take by mouth., Disp: , Rfl:     calcium carbonate (OS-SHAYAN) 600 mg calcium (1,500 mg) Tab, Take 600 mg by mouth once., Disp: , Rfl:     cholecalciferol, vitamin D3, (VITAMIN D3 ORAL), Take by mouth., Disp: , Rfl:     cycloSPORINE (RESTASIS) 0.05 % ophthalmic emulsion, 1 drop 2 (two) times daily., Disp: , Rfl:     fluticasone (FLONASE) 50 mcg/actuation nasal spray, 1 spray by Nasal route., Disp: , Rfl:     loratadine (CLARITIN) 10 mg tablet, Take 10 mg by mouth., Disp: , Rfl:     loratadine-pseudoephedrine 5-120 mg (CLARITIN-D 12 HOUR) 5-120 mg per tablet, Take by mouth., Disp: , Rfl:     multivitamin (THERAGRAN) per tablet, Take 1 tablet by mouth., Disp: , Rfl:     pantoprazole (PROTONIX) 40 MG tablet, Take 40 mg by mouth., Disp: , Rfl:     polyethylene glycol 3350 (MIRALAX ORAL), Take by mouth., Disp: , Rfl:     ranitidine (ZANTAC) 300 MG tablet, Take 300 mg by mouth every evening., Disp: , Rfl: 4    zolpidem (AMBIEN) 5 MG Tab, TAKE 1 TABLET BY MOUTH EVERY DAY AT BEDTIME AS NEEDED, Disp: 30 tablet, Rfl: 0        Objective:       Physical Examination:     /72   Pulse 90   Temp 98.1 °F (36.7 °C) (Oral)   Resp 18   Wt 74.2 kg (163 lb 9.6 oz)   BMI 27.22 kg/m²     Physical Exam   Constitutional: She appears well-developed and well-nourished.   HENT:   Head: Normocephalic and atraumatic.   Right Ear: External ear normal.   Left Ear: External ear normal.   Mouth/Throat: Oropharynx is clear and moist.   Eyes: Pupils are equal, round, and reactive to light. Conjunctivae are normal.   Neck: No tracheal deviation present. No thyromegaly present.   Cardiovascular: Normal rate, regular rhythm and normal heart sounds.   Pulmonary/Chest: Effort normal and breath sounds normal.    Abdominal: Soft. Bowel sounds are normal. She exhibits no distension and no mass. There is no tenderness.   Musculoskeletal: She exhibits no edema.   Neurological:   Neuro intact througout   Skin: No rash noted.   Psychiatric: She has a normal mood and affect. Her behavior is normal. Judgment and thought content normal.       Labs:   Recent Results (from the past 336 hour(s))   CBC auto differential    Collection Time: 12/03/19  9:41 AM   Result Value Ref Range    WBC 6.92 3.90 - 12.70 K/uL    Hemoglobin 11.9 (L) 12.0 - 16.0 g/dL    Hematocrit 36.4 (L) 37.0 - 48.5 %    Platelets 300 150 - 350 K/uL     CMP  Sodium   Date Value Ref Range Status   12/03/2019 140 136 - 145 mmol/L Final     Potassium   Date Value Ref Range Status   12/03/2019 3.8 3.5 - 5.1 mmol/L Final     Chloride   Date Value Ref Range Status   12/03/2019 105 95 - 110 mmol/L Final     CO2   Date Value Ref Range Status   12/03/2019 28 23 - 29 mmol/L Final     Glucose   Date Value Ref Range Status   12/03/2019 89 70 - 110 mg/dL Final     BUN, Bld   Date Value Ref Range Status   12/03/2019 15 8 - 23 mg/dL Final     Creatinine   Date Value Ref Range Status   12/03/2019 0.5 0.5 - 1.4 mg/dL Final     Calcium   Date Value Ref Range Status   12/03/2019 8.8 8.7 - 10.5 mg/dL Final     Anion Gap   Date Value Ref Range Status   12/03/2019 7 (L) 8 - 16 mmol/L Final     eGFR if    Date Value Ref Range Status   12/03/2019 >60.0 >60 mL/min/1.73 m^2 Final     eGFR if non    Date Value Ref Range Status   12/03/2019 >60.0 >60 mL/min/1.73 m^2 Final     Comment:     Calculation used to obtain the estimated glomerular filtration  rate (eGFR) is the CKD-EPI equation.        No results found for: CEA  No results found for: PSA        Assessment/Plan:     Problem List Items Addressed This Visit     Malignant gastrointestinal stromal tumor (GIST) of small intestine/pancreas     Patient has had EUS and Dr. Arroyo was unable to visualize the  lesion so no biopsies were taken.  At this point I feel that continued observation is reasonable but will discuss this with IR to see if this is amenable to needle biopsy via ct guidance.  Will also discuss whether there are other imaging modalities that could be helpful here.  Patient adamantly opposed to surgery/open biopsy.  Will notify her of my findings and plan.           Pancreatic mass     As above.                Discussion:     Follow up in about 2 months (around 2/10/2020).      Electronically signed by John Díaz

## 2020-02-11 ENCOUNTER — OFFICE VISIT (OUTPATIENT)
Dept: HEMATOLOGY/ONCOLOGY | Facility: CLINIC | Age: 65
End: 2020-02-11
Payer: COMMERCIAL

## 2020-02-11 VITALS
TEMPERATURE: 99 F | HEART RATE: 85 BPM | SYSTOLIC BLOOD PRESSURE: 129 MMHG | DIASTOLIC BLOOD PRESSURE: 81 MMHG | BODY MASS INDEX: 26.36 KG/M2 | WEIGHT: 158.38 LBS | RESPIRATION RATE: 19 BRPM

## 2020-02-11 DIAGNOSIS — K86.89 PANCREATIC MASS: ICD-10-CM

## 2020-02-11 DIAGNOSIS — C49.A3 MALIGNANT GASTROINTESTINAL STROMAL TUMOR (GIST) OF SMALL INTESTINE: ICD-10-CM

## 2020-02-11 DIAGNOSIS — F51.01 PRIMARY INSOMNIA: ICD-10-CM

## 2020-02-11 PROCEDURE — 99213 OFFICE O/P EST LOW 20 MIN: CPT | Mod: S$GLB,,, | Performed by: INTERNAL MEDICINE

## 2020-02-11 PROCEDURE — 3008F BODY MASS INDEX DOCD: CPT | Mod: S$GLB,,, | Performed by: INTERNAL MEDICINE

## 2020-02-11 PROCEDURE — 3008F PR BODY MASS INDEX (BMI) DOCUMENTED: ICD-10-PCS | Mod: S$GLB,,, | Performed by: INTERNAL MEDICINE

## 2020-02-11 PROCEDURE — 99213 PR OFFICE/OUTPT VISIT, EST, LEVL III, 20-29 MIN: ICD-10-PCS | Mod: S$GLB,,, | Performed by: INTERNAL MEDICINE

## 2020-02-11 RX ORDER — ZOLPIDEM TARTRATE 5 MG/1
TABLET ORAL
Qty: 30 TABLET | Refills: 0 | Status: SHIPPED | OUTPATIENT
Start: 2020-02-11 | End: 2020-03-16

## 2020-02-11 NOTE — PROGRESS NOTES
PROGRESS NOTE    Subjective:       Patient ID: Dai Cummings is a 64 y.o. female.    Chief Complaint:  No chief complaint on file.  follow up for GIST    History of Present Illness:   Dai Cummings is a 64 y.o. female who presents for routine follow up of GIST tumor.     Last ct Nov 2019, due for new ct    EUS 12/3/2019:  There was no sign of significant endosonographic abnormality in the        neck / body / tail pancreas. The pancreatic duct was regular in        contour.       Lesion of interest on CT could not be identified on EUS due to        gastric sleeve and also loop of bowel located between gastric lumen        and area of interest. Attempted EUS from transjejunal views also        unsuccessful.      Ct Abd/P Impression 11/1/2019:  1. Stable size and appearance of 3 cm mass along the anterior aspect of the pancreatic head, consistent with known gastrointestinal stromal tumor.  2. No findings of metastatic disease in the abdomen or pelvis.      CT abd/p 11/8/2018 impression:  IMPRESSION:  1. Stable 3 cm enhancing mass with internal calcification adjacent to the  pancreas, compatible with known gastrointestinal stromal tumor.  2. No findings of metastatic disease in the abdomen or the pelvis.  3. Left mid abdominal small bowel intussusception, which is very likely  transient and of doubtful clinical significance.  4. Additional stable observations as above.      Family and Social history reviewed and is unchanged from 8/12/2013      ROS:  Review of Systems   Constitutional: Negative for appetite change, fever and unexpected weight change.   Eyes: Negative for visual disturbance.   Respiratory: Negative for cough and shortness of breath.    Cardiovascular: Negative for chest pain and leg swelling.   Gastrointestinal: Negative for abdominal pain, blood in stool and diarrhea.   Genitourinary: Negative for frequency and hematuria.   Musculoskeletal:  Positive for back pain.   Skin: Negative for rash.   Neurological: Negative for headaches.   Hematological: Negative for adenopathy.   Psychiatric/Behavioral: The patient is not nervous/anxious.           Current Outpatient Medications:     biotin 10,000 mcg Cap, Take by mouth., Disp: , Rfl:     calcium carbonate (OS-SHAYAN) 600 mg calcium (1,500 mg) Tab, Take 600 mg by mouth once., Disp: , Rfl:     cholecalciferol, vitamin D3, (VITAMIN D3 ORAL), Take by mouth., Disp: , Rfl:     cycloSPORINE (RESTASIS) 0.05 % ophthalmic emulsion, 1 drop 2 (two) times daily., Disp: , Rfl:     fluticasone (FLONASE) 50 mcg/actuation nasal spray, 1 spray by Nasal route., Disp: , Rfl:     loratadine (CLARITIN) 10 mg tablet, Take 10 mg by mouth., Disp: , Rfl:     loratadine-pseudoephedrine 5-120 mg (CLARITIN-D 12 HOUR) 5-120 mg per tablet, Take by mouth., Disp: , Rfl:     multivitamin (THERAGRAN) per tablet, Take 1 tablet by mouth., Disp: , Rfl:     pantoprazole (PROTONIX) 40 MG tablet, Take 40 mg by mouth., Disp: , Rfl:     polyethylene glycol 3350 (MIRALAX ORAL), Take by mouth., Disp: , Rfl:     ranitidine (ZANTAC) 300 MG tablet, Take 300 mg by mouth every evening., Disp: , Rfl: 4    zolpidem (AMBIEN) 5 MG Tab, TAKE 1 TABLET BY MOUTH EVERY DAY AT BEDTIME AS NEEDED, Disp: 30 tablet, Rfl: 0        Objective:       Physical Examination:     /81   Pulse 85   Temp 98.7 °F (37.1 °C) (Oral)   Resp 19   Wt 71.8 kg (158 lb 6.4 oz)   BMI 26.36 kg/m²     Physical Exam   Constitutional: She appears well-developed and well-nourished.   HENT:   Head: Normocephalic and atraumatic.   Right Ear: External ear normal.   Left Ear: External ear normal.   Mouth/Throat: Oropharynx is clear and moist.   Eyes: Pupils are equal, round, and reactive to light. Conjunctivae are normal.   Neck: No tracheal deviation present. No thyromegaly present.   Cardiovascular: Normal rate, regular rhythm and normal heart sounds.   Pulmonary/Chest: Effort  normal and breath sounds normal.   Abdominal: Soft. Bowel sounds are normal. She exhibits no distension and no mass. There is no tenderness.   Musculoskeletal: She exhibits no edema.   Neurological:   Neuro intact througout   Skin: No rash noted.   Psychiatric: She has a normal mood and affect. Her behavior is normal. Judgment and thought content normal.       Labs:   No results found for this or any previous visit (from the past 336 hour(s)).  CMP  Sodium   Date Value Ref Range Status   12/03/2019 140 136 - 145 mmol/L Final     Potassium   Date Value Ref Range Status   12/03/2019 3.8 3.5 - 5.1 mmol/L Final     Chloride   Date Value Ref Range Status   12/03/2019 105 95 - 110 mmol/L Final     CO2   Date Value Ref Range Status   12/03/2019 28 23 - 29 mmol/L Final     Glucose   Date Value Ref Range Status   12/03/2019 89 70 - 110 mg/dL Final     BUN, Bld   Date Value Ref Range Status   12/03/2019 15 8 - 23 mg/dL Final     Creatinine   Date Value Ref Range Status   12/03/2019 0.5 0.5 - 1.4 mg/dL Final     Calcium   Date Value Ref Range Status   12/03/2019 8.8 8.7 - 10.5 mg/dL Final     Anion Gap   Date Value Ref Range Status   12/03/2019 7 (L) 8 - 16 mmol/L Final     eGFR if    Date Value Ref Range Status   12/03/2019 >60.0 >60 mL/min/1.73 m^2 Final     eGFR if non    Date Value Ref Range Status   12/03/2019 >60.0 >60 mL/min/1.73 m^2 Final     Comment:     Calculation used to obtain the estimated glomerular filtration  rate (eGFR) is the CKD-EPI equation.        No results found for: CEA  No results found for: PSA        Assessment/Plan:     Problem List Items Addressed This Visit     Primary insomnia    Relevant Medications    zolpidem (AMBIEN) 5 MG Tab    Malignant gastrointestinal stromal tumor (GIST) of small intestine/pancreas     I had a long discussion with the patient and plan will be to get continued CT imaging.  There has been no growth in the lesion and this was not seen on the  EUS.  Discussed this today in detail.           Relevant Orders    CT Abdomen Pelvis With Contrast    Creatinine, serum    Pancreatic mass     Will repeat scan as outlined above.          Relevant Orders    Creatinine, serum          Discussion:     Follow up in about 3 weeks (around 3/3/2020).      Electronically signed by John Díaz

## 2020-02-11 NOTE — ASSESSMENT & PLAN NOTE
I had a long discussion with the patient and plan will be to get continued CT imaging.  There has been no growth in the lesion and this was not seen on the EUS.  Discussed this today in detail.

## 2020-02-26 ENCOUNTER — LAB VISIT (OUTPATIENT)
Dept: LAB | Facility: HOSPITAL | Age: 65
End: 2020-02-26
Attending: INTERNAL MEDICINE
Payer: COMMERCIAL

## 2020-02-26 ENCOUNTER — HOSPITAL ENCOUNTER (OUTPATIENT)
Dept: RADIOLOGY | Facility: HOSPITAL | Age: 65
Discharge: HOME | End: 2020-02-26
Attending: INTERNAL MEDICINE
Payer: COMMERCIAL

## 2020-02-26 DIAGNOSIS — C49.A3 MALIGNANT GASTROINTESTINAL STROMAL TUMOR (GIST) OF SMALL INTESTINE: ICD-10-CM

## 2020-02-26 DIAGNOSIS — C49.A3 GASTROINTESTINAL STROMAL TUMOR OF SMALL INTESTINE: Primary | ICD-10-CM

## 2020-02-26 DIAGNOSIS — K86.89 SUBCUTANEOUS NODULAR FAT NECROSIS IN PANCREATITIS: ICD-10-CM

## 2020-02-26 LAB
CREAT SERPL-MCNC: 0.5 MG/DL (ref 0.5–1.4)
EST. GFR  (AFRICAN AMERICAN): >60 ML/MIN/1.73 M^2
EST. GFR  (NON AFRICAN AMERICAN): >60 ML/MIN/1.73 M^2

## 2020-02-26 PROCEDURE — 74178 CT ABD&PLV WO CNTR FLWD CNTR: CPT | Mod: TC

## 2020-02-26 PROCEDURE — 36415 COLL VENOUS BLD VENIPUNCTURE: CPT

## 2020-02-26 PROCEDURE — 82565 ASSAY OF CREATININE: CPT

## 2020-02-26 PROCEDURE — 25500020 PHARM REV CODE 255: Performed by: INTERNAL MEDICINE

## 2020-02-26 RX ADMIN — IOHEXOL 100 ML: 350 INJECTION, SOLUTION INTRAVENOUS at 10:02

## 2020-03-04 ENCOUNTER — OFFICE VISIT (OUTPATIENT)
Dept: HEMATOLOGY/ONCOLOGY | Facility: CLINIC | Age: 65
End: 2020-03-04
Payer: COMMERCIAL

## 2020-03-04 VITALS
TEMPERATURE: 98 F | BODY MASS INDEX: 26.19 KG/M2 | SYSTOLIC BLOOD PRESSURE: 125 MMHG | HEART RATE: 74 BPM | RESPIRATION RATE: 20 BRPM | WEIGHT: 157.38 LBS | DIASTOLIC BLOOD PRESSURE: 73 MMHG

## 2020-03-04 DIAGNOSIS — C49.A3 MALIGNANT GASTROINTESTINAL STROMAL TUMOR (GIST) OF SMALL INTESTINE: ICD-10-CM

## 2020-03-04 PROCEDURE — 99213 OFFICE O/P EST LOW 20 MIN: CPT | Mod: S$GLB,,, | Performed by: INTERNAL MEDICINE

## 2020-03-04 PROCEDURE — 99213 PR OFFICE/OUTPT VISIT, EST, LEVL III, 20-29 MIN: ICD-10-PCS | Mod: S$GLB,,, | Performed by: INTERNAL MEDICINE

## 2020-03-04 PROCEDURE — 3008F BODY MASS INDEX DOCD: CPT | Mod: S$GLB,,, | Performed by: INTERNAL MEDICINE

## 2020-03-04 PROCEDURE — 3008F PR BODY MASS INDEX (BMI) DOCUMENTED: ICD-10-PCS | Mod: S$GLB,,, | Performed by: INTERNAL MEDICINE

## 2020-03-04 NOTE — PROGRESS NOTES
PROGRESS NOTE    Subjective:       Patient ID: Dai Cummings is a 64 y.o. female.    Chief Complaint:  No chief complaint on file.  follow up for GIST    History of Present Illness:   Dai Cummings is a 64 y.o. female who presents for routine follow up of GIST tumor.     Patient returns for scan results today.  She still have some mid back pain without change.     CT abd/p impression 2/26/2020:  1. No evidence of recurrent malignancy or metastatic disease.  2. Unchanged recently described 30 x 14 mm hypodense focus anterior to pancreatic head as discussed above.  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  3. Long segment distal ascending and transverse colon wall thickening could be due to contraction or inadequate distention, although in the appropriate clinical setting, infectious or inflammatory colitis can be considered.  Clinical and laboratory correlation is needed.  4. Interval L1 vertebroplasty.    EUS 12/3/2019:  There was no sign of significant endosonographic abnormality in the        neck / body / tail pancreas. The pancreatic duct was regular in        contour.       Lesion of interest on CT could not be identified on EUS due to        gastric sleeve and also loop of bowel located between gastric lumen        and area of interest. Attempted EUS from transjejunal views also        unsuccessful.      Ct Abd/P Impression 11/1/2019:  1. Stable size and appearance of 3 cm mass along the anterior aspect of the pancreatic head, consistent with known gastrointestinal stromal tumor.  2. No findings of metastatic disease in the abdomen or pelvis.      CT abd/p 11/8/2018 impression:  IMPRESSION:  1. Stable 3 cm enhancing mass with internal calcification adjacent to the  pancreas, compatible with known gastrointestinal stromal tumor.  2. No  findings of metastatic disease in the abdomen or the pelvis.  3. Left mid abdominal small bowel intussusception, which is very likely  transient and of doubtful clinical significance.  4. Additional stable observations as above.      Family and Social history reviewed and is unchanged from 8/12/2013      ROS:  Review of Systems   Constitutional: Negative for appetite change, fever and unexpected weight change.   Eyes: Negative for visual disturbance.   Respiratory: Negative for cough and shortness of breath.    Cardiovascular: Negative for chest pain and leg swelling.   Gastrointestinal: Negative for abdominal pain, blood in stool and diarrhea.   Genitourinary: Negative for frequency and hematuria.   Musculoskeletal: Positive for back pain.   Skin: Negative for rash.   Neurological: Negative for headaches.   Hematological: Negative for adenopathy.   Psychiatric/Behavioral: The patient is not nervous/anxious.           Current Outpatient Medications:     biotin 10,000 mcg Cap, Take by mouth., Disp: , Rfl:     calcium carbonate (OS-SHAYAN) 600 mg calcium (1,500 mg) Tab, Take 600 mg by mouth once., Disp: , Rfl:     cholecalciferol, vitamin D3, (VITAMIN D3 ORAL), Take by mouth., Disp: , Rfl:     cycloSPORINE (RESTASIS) 0.05 % ophthalmic emulsion, 1 drop 2 (two) times daily., Disp: , Rfl:     fluticasone (FLONASE) 50 mcg/actuation nasal spray, 1 spray by Nasal route., Disp: , Rfl:     loratadine (CLARITIN) 10 mg tablet, Take 10 mg by mouth., Disp: , Rfl:     loratadine-pseudoephedrine 5-120 mg (CLARITIN-D 12 HOUR) 5-120 mg per tablet, Take by mouth., Disp: , Rfl:     multivitamin (THERAGRAN) per tablet, Take 1 tablet by mouth., Disp: , Rfl:     pantoprazole (PROTONIX) 40 MG tablet, Take 40 mg by mouth., Disp: , Rfl:     polyethylene glycol 3350 (MIRALAX ORAL), Take by mouth., Disp: , Rfl:     ranitidine (ZANTAC) 300 MG tablet, Take 300 mg by mouth every evening., Disp: , Rfl: 4    zolpidem (AMBIEN) 5 MG Tab,  TAKE 1 TABLET BY MOUTH EVERY DAY AT BEDTIME AS NEEDED, Disp: 30 tablet, Rfl: 0        Objective:       Physical Examination:     There were no vitals taken for this visit.    Physical Exam   Constitutional: She appears well-developed and well-nourished.   HENT:   Head: Normocephalic and atraumatic.   Right Ear: External ear normal.   Left Ear: External ear normal.   Mouth/Throat: Oropharynx is clear and moist.   Eyes: Pupils are equal, round, and reactive to light. Conjunctivae are normal.   Neck: No tracheal deviation present. No thyromegaly present.   Cardiovascular: Normal rate, regular rhythm and normal heart sounds.   Pulmonary/Chest: Effort normal and breath sounds normal.   Abdominal: Soft. Bowel sounds are normal. She exhibits no distension and no mass. There is no tenderness.   Musculoskeletal: She exhibits no edema.   Neurological:   Neuro intact througout   Skin: No rash noted.   Psychiatric: She has a normal mood and affect. Her behavior is normal. Judgment and thought content normal.       Labs:   No results found for this or any previous visit (from the past 336 hour(s)).  CMP  Sodium   Date Value Ref Range Status   12/03/2019 140 136 - 145 mmol/L Final     Potassium   Date Value Ref Range Status   12/03/2019 3.8 3.5 - 5.1 mmol/L Final     Chloride   Date Value Ref Range Status   12/03/2019 105 95 - 110 mmol/L Final     CO2   Date Value Ref Range Status   12/03/2019 28 23 - 29 mmol/L Final     Glucose   Date Value Ref Range Status   12/03/2019 89 70 - 110 mg/dL Final     BUN, Bld   Date Value Ref Range Status   12/03/2019 15 8 - 23 mg/dL Final     Creatinine   Date Value Ref Range Status   02/26/2020 0.5 0.5 - 1.4 mg/dL Final     Calcium   Date Value Ref Range Status   12/03/2019 8.8 8.7 - 10.5 mg/dL Final     Anion Gap   Date Value Ref Range Status   12/03/2019 7 (L) 8 - 16 mmol/L Final     eGFR if    Date Value Ref Range Status   02/26/2020 >60.0 >60 mL/min/1.73 m^2 Final     eGFR if  non    Date Value Ref Range Status   02/26/2020 >60.0 >60 mL/min/1.73 m^2 Final     Comment:     Calculation used to obtain the estimated glomerular filtration  rate (eGFR) is the CKD-EPI equation.        No results found for: CEA  No results found for: PSA        Assessment/Plan:     Problem List Items Addressed This Visit     Malignant gastrointestinal stromal tumor (GIST) of small intestine/pancreas     Patient is doing well and the mass in there abdomen has been explained now as a loop of bowel unchanged since 2013.  I don't think she has any active cancer at this time and discussed this today.  I will have her back with me again in six months and see how she is doing.  Return to yearly scanning seems reasonable.           Relevant Orders    CBC auto differential    Comprehensive metabolic panel          Discussion:     Follow up in about 6 months (around 9/4/2020).      Electronically signed by John Díaz

## 2020-03-04 NOTE — ASSESSMENT & PLAN NOTE
Patient is doing well and the mass in there abdomen has been explained now as a loop of bowel unchanged since 2013.  I don't think she has any active cancer at this time and discussed this today.  I will have her back with me again in six months and see how she is doing.  Return to yearly scanning seems reasonable.

## 2020-03-16 DIAGNOSIS — F51.01 PRIMARY INSOMNIA: ICD-10-CM

## 2020-03-16 RX ORDER — ZOLPIDEM TARTRATE 5 MG/1
TABLET ORAL
Qty: 30 TABLET | Refills: 2 | Status: SHIPPED | OUTPATIENT
Start: 2020-03-16 | End: 2020-06-29

## 2020-03-18 NOTE — TELEPHONE ENCOUNTER
----- Message from Regla Sanabria sent at 3/18/2020  1:35 PM CDT -----  Pt said [facility] [address] in [place] told her they need a paper copy of her rx for zolpidem faxed over. [facility] phone number is [phone]    CB# [phone]      Spoke to [facility] and gave ok to refill Ambien for her. I then called Ms. Cummings and let her know as well.

## 2020-05-03 DIAGNOSIS — Z03.818 ENCNTR FOR OBS FOR SUSP EXPSR TO OTH BIOLG AGENTS RULED OUT: Primary | ICD-10-CM

## 2020-05-04 ENCOUNTER — LAB VISIT (OUTPATIENT)
Dept: INFUSION THERAPY | Facility: HOSPITAL | Age: 65
End: 2020-05-04
Attending: NURSE PRACTITIONER
Payer: COMMERCIAL

## 2020-05-04 ENCOUNTER — LAB VISIT (OUTPATIENT)
Dept: LAB | Facility: HOSPITAL | Age: 65
End: 2020-05-04
Attending: INTERNAL MEDICINE
Payer: COMMERCIAL

## 2020-05-04 DIAGNOSIS — Z03.818 ENCNTR FOR OBS FOR SUSP EXPSR TO OTH BIOLG AGENTS RULED OUT: ICD-10-CM

## 2020-05-04 DIAGNOSIS — C49.A3 MALIGNANT GASTROINTESTINAL STROMAL TUMOR (GIST) OF SMALL INTESTINE: ICD-10-CM

## 2020-05-04 PROBLEM — M81.0 SENILE OSTEOPOROSIS: Status: ACTIVE | Noted: 2020-05-04

## 2020-05-04 LAB
ALBUMIN SERPL BCP-MCNC: 4.2 G/DL (ref 3.5–5.2)
ALP SERPL-CCNC: 86 U/L (ref 55–135)
ALT SERPL W/O P-5'-P-CCNC: 48 U/L (ref 10–44)
ANION GAP SERPL CALC-SCNC: 8 MMOL/L (ref 8–16)
AST SERPL-CCNC: 31 U/L (ref 10–40)
BASOPHILS # BLD AUTO: 0.04 K/UL (ref 0–0.2)
BASOPHILS NFR BLD: 0.7 % (ref 0–1.9)
BILIRUB SERPL-MCNC: 0.5 MG/DL (ref 0.1–1)
BUN SERPL-MCNC: 16 MG/DL (ref 8–23)
CALCIUM SERPL-MCNC: 9.1 MG/DL (ref 8.7–10.5)
CHLORIDE SERPL-SCNC: 103 MMOL/L (ref 95–110)
CO2 SERPL-SCNC: 28 MMOL/L (ref 23–29)
CREAT SERPL-MCNC: 0.5 MG/DL (ref 0.5–1.4)
DIFFERENTIAL METHOD: ABNORMAL
EOSINOPHIL # BLD AUTO: 0.1 K/UL (ref 0–0.5)
EOSINOPHIL NFR BLD: 1.8 % (ref 0–8)
ERYTHROCYTE [DISTWIDTH] IN BLOOD BY AUTOMATED COUNT: 12.9 % (ref 11.5–14.5)
EST. GFR  (AFRICAN AMERICAN): >60 ML/MIN/1.73 M^2
EST. GFR  (NON AFRICAN AMERICAN): >60 ML/MIN/1.73 M^2
GLUCOSE SERPL-MCNC: 132 MG/DL (ref 70–110)
HCT VFR BLD AUTO: 38.9 % (ref 37–48.5)
HGB BLD-MCNC: 12.6 G/DL (ref 12–16)
IMM GRANULOCYTES # BLD AUTO: 0.02 K/UL (ref 0–0.04)
IMM GRANULOCYTES NFR BLD AUTO: 0.3 % (ref 0–0.5)
LYMPHOCYTES # BLD AUTO: 1.1 K/UL (ref 1–4.8)
LYMPHOCYTES NFR BLD: 18.2 % (ref 18–48)
MCH RBC QN AUTO: 29.9 PG (ref 27–31)
MCHC RBC AUTO-ENTMCNC: 32.4 G/DL (ref 32–36)
MCV RBC AUTO: 92 FL (ref 82–98)
MONOCYTES # BLD AUTO: 0.3 K/UL (ref 0.3–1)
MONOCYTES NFR BLD: 5.1 % (ref 4–15)
NEUTROPHILS # BLD AUTO: 4.5 K/UL (ref 1.8–7.7)
NEUTROPHILS NFR BLD: 73.9 % (ref 38–73)
NRBC BLD-RTO: 0 /100 WBC
PLATELET # BLD AUTO: 289 K/UL (ref 150–350)
PMV BLD AUTO: 10.3 FL (ref 9.2–12.9)
POTASSIUM SERPL-SCNC: 3.2 MMOL/L (ref 3.5–5.1)
PROT SERPL-MCNC: 7.1 G/DL (ref 6–8.4)
RBC # BLD AUTO: 4.22 M/UL (ref 4–5.4)
SODIUM SERPL-SCNC: 139 MMOL/L (ref 136–145)
WBC # BLD AUTO: 6.11 K/UL (ref 3.9–12.7)

## 2020-05-04 PROCEDURE — 80053 COMPREHEN METABOLIC PANEL: CPT

## 2020-05-04 PROCEDURE — 85025 COMPLETE CBC W/AUTO DIFF WBC: CPT

## 2020-05-04 PROCEDURE — U0002 COVID-19 LAB TEST NON-CDC: HCPCS

## 2020-05-04 PROCEDURE — 36415 COLL VENOUS BLD VENIPUNCTURE: CPT

## 2020-05-05 LAB — SARS-COV-2 RNA RESP QL NAA+PROBE: NOT DETECTED

## 2020-05-07 ENCOUNTER — INFUSION (OUTPATIENT)
Dept: INFUSION THERAPY | Facility: HOSPITAL | Age: 65
End: 2020-05-07
Attending: INTERNAL MEDICINE
Payer: COMMERCIAL

## 2020-05-07 VITALS
OXYGEN SATURATION: 99 % | DIASTOLIC BLOOD PRESSURE: 80 MMHG | WEIGHT: 164.88 LBS | BODY MASS INDEX: 27.44 KG/M2 | RESPIRATION RATE: 18 BRPM | HEART RATE: 76 BPM | TEMPERATURE: 98 F | SYSTOLIC BLOOD PRESSURE: 126 MMHG

## 2020-05-07 DIAGNOSIS — M81.0 SENILE OSTEOPOROSIS: Primary | ICD-10-CM

## 2020-05-07 PROCEDURE — 63600175 PHARM REV CODE 636 W HCPCS: Mod: JG | Performed by: INTERNAL MEDICINE

## 2020-05-07 PROCEDURE — 96372 THER/PROPH/DIAG INJ SC/IM: CPT

## 2020-05-07 RX ADMIN — DENOSUMAB 60 MG: 60 INJECTION SUBCUTANEOUS at 02:05

## 2020-05-07 NOTE — PLAN OF CARE
Problem: Fall Injury Risk  Goal: Absence of Fall and Fall-Related Injury  Outcome: Ongoing, Progressing  Intervention: Promote Injury-Free Environment  Flowsheets (Taken 5/7/2020 1416)  Safety Promotion/Fall Prevention: medications reviewed  Environmental Safety Modification: clutter free environment maintained

## 2020-08-29 LAB
ALBUMIN SERPL-MCNC: 4 G/DL (ref 3.6–5.1)
ALBUMIN/GLOB SERPL: 1.7 (CALC) (ref 1–2.5)
ALP SERPL-CCNC: 61 U/L (ref 37–153)
ALT SERPL-CCNC: 26 U/L (ref 6–29)
AST SERPL-CCNC: 20 U/L (ref 10–35)
BASOPHILS # BLD AUTO: 48 CELLS/UL (ref 0–200)
BASOPHILS NFR BLD AUTO: 1 %
BILIRUB SERPL-MCNC: 0.5 MG/DL (ref 0.2–1.2)
BUN SERPL-MCNC: 14 MG/DL (ref 7–25)
BUN/CREAT SERPL: NORMAL (CALC) (ref 6–22)
CALCIUM SERPL-MCNC: 8.9 MG/DL (ref 8.6–10.4)
CHLORIDE SERPL-SCNC: 106 MMOL/L (ref 98–110)
CO2 SERPL-SCNC: 28 MMOL/L (ref 20–32)
CREAT SERPL-MCNC: 0.58 MG/DL (ref 0.5–0.99)
EOSINOPHIL # BLD AUTO: 110 CELLS/UL (ref 15–500)
EOSINOPHIL NFR BLD AUTO: 2.3 %
ERYTHROCYTE [DISTWIDTH] IN BLOOD BY AUTOMATED COUNT: 12.6 % (ref 11–15)
GFRSERPLBLD MDRD-ARVRAT: 99 ML/MIN/1.73M2
GLOBULIN SER CALC-MCNC: 2.3 G/DL (CALC) (ref 1.9–3.7)
GLUCOSE SERPL-MCNC: 86 MG/DL (ref 65–99)
HCT VFR BLD AUTO: 40.1 % (ref 35–45)
HGB BLD-MCNC: 12.9 G/DL (ref 11.7–15.5)
LYMPHOCYTES # BLD AUTO: 1056 CELLS/UL (ref 850–3900)
LYMPHOCYTES NFR BLD AUTO: 22 %
MCH RBC QN AUTO: 30.1 PG (ref 27–33)
MCHC RBC AUTO-ENTMCNC: 32.2 G/DL (ref 32–36)
MCV RBC AUTO: 93.7 FL (ref 80–100)
MONOCYTES # BLD AUTO: 379 CELLS/UL (ref 200–950)
MONOCYTES NFR BLD AUTO: 7.9 %
NEUTROPHILS # BLD AUTO: 3206 CELLS/UL (ref 1500–7800)
NEUTROPHILS NFR BLD AUTO: 66.8 %
PLATELET # BLD AUTO: 270 THOUSAND/UL (ref 140–400)
PMV BLD REES-ECKER: 10.8 FL (ref 7.5–12.5)
POTASSIUM SERPL-SCNC: 4.1 MMOL/L (ref 3.5–5.3)
PROT SERPL-MCNC: 6.3 G/DL (ref 6.1–8.1)
RBC # BLD AUTO: 4.28 MILLION/UL (ref 3.8–5.1)
SODIUM SERPL-SCNC: 139 MMOL/L (ref 135–146)
WBC # BLD AUTO: 4.8 THOUSAND/UL (ref 3.8–10.8)

## 2020-09-02 ENCOUNTER — OFFICE VISIT (OUTPATIENT)
Dept: HEMATOLOGY/ONCOLOGY | Facility: CLINIC | Age: 65
End: 2020-09-02
Payer: COMMERCIAL

## 2020-09-02 VITALS
SYSTOLIC BLOOD PRESSURE: 103 MMHG | RESPIRATION RATE: 18 BRPM | TEMPERATURE: 97 F | WEIGHT: 170.31 LBS | DIASTOLIC BLOOD PRESSURE: 61 MMHG | HEART RATE: 69 BPM | BODY MASS INDEX: 28.34 KG/M2

## 2020-09-02 DIAGNOSIS — R92.30 INCONCLUSIVE MAMMOGRAM DUE TO DENSE BREASTS: ICD-10-CM

## 2020-09-02 DIAGNOSIS — C49.A3 MALIGNANT GASTROINTESTINAL STROMAL TUMOR (GIST) OF SMALL INTESTINE: ICD-10-CM

## 2020-09-02 DIAGNOSIS — R92.2 INCONCLUSIVE MAMMOGRAM DUE TO DENSE BREASTS: ICD-10-CM

## 2020-09-02 PROCEDURE — 3008F BODY MASS INDEX DOCD: CPT | Mod: S$GLB,,, | Performed by: INTERNAL MEDICINE

## 2020-09-02 PROCEDURE — 3008F PR BODY MASS INDEX (BMI) DOCUMENTED: ICD-10-PCS | Mod: S$GLB,,, | Performed by: INTERNAL MEDICINE

## 2020-09-02 PROCEDURE — 99214 PR OFFICE/OUTPT VISIT, EST, LEVL IV, 30-39 MIN: ICD-10-PCS | Mod: S$GLB,,, | Performed by: INTERNAL MEDICINE

## 2020-09-02 PROCEDURE — 99214 OFFICE O/P EST MOD 30 MIN: CPT | Mod: S$GLB,,, | Performed by: INTERNAL MEDICINE

## 2020-09-02 NOTE — PROGRESS NOTES
PROGRESS NOTE    Subjective:       Patient ID: Dai Cummings is a 64 y.o. female.    Chief Complaint:  No chief complaint on file.  follow up for GIST    History of Present Illness:   Dai Cummings is a 64 y.o. female who presents for routine follow up of GIST tumor.     Patient has no new complaints today.  She had a mammogram in August 2020 which was negative but shows dense breast and a 33% predicted ALEKSANDRA risk of breast cancer.     CT abd/p impression 2/26/2020:  1. No evidence of recurrent malignancy or metastatic disease.  2. Unchanged recently described 30 x 14 mm hypodense focus anterior to pancreatic head as discussed above.  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  3. Long segment distal ascending and transverse colon wall thickening could be due to contraction or inadequate distention, although in the appropriate clinical setting, infectious or inflammatory colitis can be considered.  Clinical and laboratory correlation is needed.  4. Interval L1 vertebroplasty.    EUS 12/3/2019:  There was no sign of significant endosonographic abnormality in the        neck / body / tail pancreas. The pancreatic duct was regular in        contour.       Lesion of interest on CT could not be identified on EUS due to        gastric sleeve and also loop of bowel located between gastric lumen        and area of interest. Attempted EUS from transjejunal views also        unsuccessful.      Ct Abd/P Impression 11/1/2019:  1. Stable size and appearance of 3 cm mass along the anterior aspect of the pancreatic head, consistent with known gastrointestinal stromal tumor.  2. No findings of metastatic disease in the abdomen or pelvis.      CT abd/p 11/8/2018 impression:  IMPRESSION:  1. Stable 3 cm enhancing mass with internal calcification adjacent to  the  pancreas, compatible with known gastrointestinal stromal tumor.  2. No findings of metastatic disease in the abdomen or the pelvis.  3. Left mid abdominal small bowel intussusception, which is very likely  transient and of doubtful clinical significance.  4. Additional stable observations as above.      Family and Social history reviewed and is unchanged from 8/12/2013      ROS:  Review of Systems   Constitutional: Negative for appetite change, fever and unexpected weight change.   Eyes: Negative for visual disturbance.   Respiratory: Negative for cough and shortness of breath.    Cardiovascular: Negative for chest pain and leg swelling.   Gastrointestinal: Negative for abdominal pain, blood in stool and diarrhea.   Genitourinary: Negative for frequency and hematuria.   Musculoskeletal: Positive for back pain.   Skin: Negative for rash.   Neurological: Negative for headaches.   Hematological: Negative for adenopathy.   Psychiatric/Behavioral: The patient is not nervous/anxious.           Current Outpatient Medications:     biotin 10,000 mcg Cap, Take by mouth., Disp: , Rfl:     calcium carbonate (OS-SHAYAN) 600 mg calcium (1,500 mg) Tab, Take 600 mg by mouth once., Disp: , Rfl:     cholecalciferol, vitamin D3, (VITAMIN D3 ORAL), Take by mouth., Disp: , Rfl:     cycloSPORINE (RESTASIS) 0.05 % ophthalmic emulsion, 1 drop 2 (two) times daily., Disp: , Rfl:     fluticasone (FLONASE) 50 mcg/actuation nasal spray, 1 spray by Nasal route., Disp: , Rfl:     loratadine (CLARITIN) 10 mg tablet, Take 10 mg by mouth., Disp: , Rfl:     loratadine-pseudoephedrine 5-120 mg (CLARITIN-D 12 HOUR) 5-120 mg per tablet, Take by mouth., Disp: , Rfl:     multivitamin (THERAGRAN) per tablet, Take 1 tablet by mouth., Disp: , Rfl:     pantoprazole (PROTONIX) 40 MG tablet, Take 40 mg by mouth., Disp: , Rfl:     polyethylene glycol 3350 (MIRALAX ORAL), Take by mouth., Disp: , Rfl:     ranitidine (ZANTAC) 300 MG tablet, Take 300 mg  by mouth every evening., Disp: , Rfl: 4    zolpidem (AMBIEN) 5 MG Tab, TAKE 1 TABLET BY MOUTH EVERY DAY AT BEDTIME AS NEEDED FOR INSOMNIA, Disp: 30 tablet, Rfl: 3        Objective:       Physical Examination:     There were no vitals taken for this visit.    Physical Exam  Constitutional:       Appearance: She is well-developed.   HENT:      Head: Normocephalic and atraumatic.      Right Ear: External ear normal.      Left Ear: External ear normal.   Eyes:      Conjunctiva/sclera: Conjunctivae normal.      Pupils: Pupils are equal, round, and reactive to light.   Neck:      Thyroid: No thyromegaly.      Trachea: No tracheal deviation.   Cardiovascular:      Rate and Rhythm: Normal rate and regular rhythm.      Heart sounds: Normal heart sounds.   Pulmonary:      Effort: Pulmonary effort is normal.      Breath sounds: Normal breath sounds.   Abdominal:      General: Bowel sounds are normal. There is no distension.      Palpations: Abdomen is soft. There is no mass.      Tenderness: There is no abdominal tenderness.   Skin:     Findings: No rash.   Neurological:      Comments: Neuro intact througout   Psychiatric:         Behavior: Behavior normal.         Thought Content: Thought content normal.         Judgment: Judgment normal.         Labs:   Recent Results (from the past 336 hour(s))   CBC auto differential    Collection Time: 08/28/20  9:21 AM   Result Value Ref Range    WBC 4.8 3.8 - 10.8 Thousand/uL    Hemoglobin 12.9 11.7 - 15.5 g/dL    Hematocrit 40.1 35.0 - 45.0 %    Platelets 270 140 - 400 Thousand/uL     CMP  Sodium   Date Value Ref Range Status   08/28/2020 139 135 - 146 mmol/L Final     Potassium   Date Value Ref Range Status   08/28/2020 4.1 3.5 - 5.3 mmol/L Final     Chloride   Date Value Ref Range Status   08/28/2020 106 98 - 110 mmol/L Final     CO2   Date Value Ref Range Status   08/28/2020 28 20 - 32 mmol/L Final     Glucose   Date Value Ref Range Status   08/28/2020 86 65 - 99 mg/dL Final      Comment:                   Fasting reference interval          BUN, Bld   Date Value Ref Range Status   08/28/2020 14 7 - 25 mg/dL Final     Creatinine   Date Value Ref Range Status   08/28/2020 0.58 0.50 - 0.99 mg/dL Final     Comment:     For patients >49 years of age, the reference limit  for Creatinine is approximately 13% higher for people  identified as -American.          Calcium   Date Value Ref Range Status   08/28/2020 8.9 8.6 - 10.4 mg/dL Final     Total Protein   Date Value Ref Range Status   08/28/2020 6.3 6.1 - 8.1 g/dL Final     Albumin   Date Value Ref Range Status   08/28/2020 4.0 3.6 - 5.1 g/dL Final     Total Bilirubin   Date Value Ref Range Status   08/28/2020 0.5 0.2 - 1.2 mg/dL Final     Alkaline Phosphatase   Date Value Ref Range Status   05/04/2020 86 55 - 135 U/L Final     AST   Date Value Ref Range Status   08/28/2020 20 10 - 35 U/L Final     ALT   Date Value Ref Range Status   08/28/2020 26 6 - 29 U/L Final     Anion Gap   Date Value Ref Range Status   05/04/2020 8 8 - 16 mmol/L Final     eGFR if    Date Value Ref Range Status   08/28/2020 114 > OR = 60 mL/min/1.73m2 Final     eGFR if non    Date Value Ref Range Status   08/28/2020 99 > OR = 60 mL/min/1.73m2 Final     No results found for: CEA  No results found for: PSA        Assessment/Plan:     Problem List Items Addressed This Visit     Malignant gastrointestinal stromal tumor (GIST) of small intestine/pancreas     Patient is doing well from this standpoint and will be due for scanning again in Feb 2021 and will arrange this now.  She appears MICHAEL at this time.          Relevant Orders    MRI Breast w/o Contrast, Bilateral    CBC auto differential    Comprehensive metabolic panel    CT Abdomen Pelvis With Contrast    Inconclusive mammogram due to dense breasts     This is a new finding and I discussed this with her today.  I will arrange for her to have an MRI to be sure, evangelina in light of the  elevated klaus risk noted.           Relevant Orders    MRI Breast w/o Contrast, Bilateral    CBC auto differential    Comprehensive metabolic panel    CT Abdomen Pelvis With Contrast          Discussion:     Follow up in about 6 months (around 3/2/2021).      Electronically signed by John Díza

## 2020-09-02 NOTE — ASSESSMENT & PLAN NOTE
Patient is doing well from this standpoint and will be due for scanning again in Feb 2021 and will arrange this now.  She appears MICHAEL at this time.

## 2020-09-02 NOTE — ASSESSMENT & PLAN NOTE
This is a new finding and I discussed this with her today.  I will arrange for her to have an MRI to be sure, evangelina in light of the elevated klaus risk noted.

## 2020-09-21 ENCOUNTER — TELEPHONE (OUTPATIENT)
Dept: HEMATOLOGY/ONCOLOGY | Facility: CLINIC | Age: 65
End: 2020-09-21

## 2020-09-21 NOTE — TELEPHONE ENCOUNTER
----- Message from Millie Barry, Patient Care Assistant sent at 9/21/2020 12:13 PM CDT -----  Patient called in stating she need to speak to someone regarding her CT Scan. She would like to know how soon should she get her CT Scan  done Before her Appt. Due to her Meeting her Deductible . She can be reached at [phone]    Spoke to Dai. Explained that he wants her to have the MRI of breast done now and then the Ct done in Feb. 21 before her follow up in early March. She voiced understanding

## 2020-09-23 ENCOUNTER — TELEPHONE (OUTPATIENT)
Dept: HEMATOLOGY/ONCOLOGY | Facility: CLINIC | Age: 65
End: 2020-09-23

## 2020-09-23 NOTE — TELEPHONE ENCOUNTER
----- Message from Millie Barry, Patient Care Assistant sent at 9/23/2020 11:58 AM CDT -----  Patient called in stating [facility] did not receive her order for her MRI . She can be reached at [phone]    Spoke to patient. She wants order for MRI of breast sent

## 2020-09-28 DIAGNOSIS — C49.A3 MALIGNANT GASTROINTESTINAL STROMAL TUMOR (GIST) OF SMALL INTESTINE: ICD-10-CM

## 2020-09-28 DIAGNOSIS — R92.2 INCONCLUSIVE MAMMOGRAM DUE TO DENSE BREASTS: Primary | ICD-10-CM

## 2020-09-28 DIAGNOSIS — R92.30 INCONCLUSIVE MAMMOGRAM DUE TO DENSE BREASTS: Primary | ICD-10-CM

## 2020-09-28 NOTE — PROGRESS NOTES
[facility] requested orders to be with and with out. Per Dr. Correa orders chenged.

## 2020-10-14 DIAGNOSIS — R93.89 ABNORMAL MRI: ICD-10-CM

## 2020-10-14 DIAGNOSIS — K22.89 ESOPHAGEAL THICKENING: Primary | ICD-10-CM

## 2020-10-14 NOTE — PROGRESS NOTES
Discussed with the patient the results of her breast MRI were negative, but show esophageal thickening and Dr. Correa recommended follow up with GI for EGD. Patient has seen Dr. Colindres in the past referral placed and MRI faxed.

## 2020-11-03 DIAGNOSIS — F51.01 PRIMARY INSOMNIA: ICD-10-CM

## 2020-11-03 NOTE — TELEPHONE ENCOUNTER
----- Message from Millie Barry, Patient Care Assistant sent at 11/2/2020 12:10 PM CST -----  Regarding: Rx Request  Patient called in for a prescription refill= Ambien 5 mg. , she would like them sent to the [facility] pharmacy [facility] the pharmacy  # [phone]. She can be reached at [phone]

## 2020-11-04 RX ORDER — ZOLPIDEM TARTRATE 5 MG/1
TABLET ORAL
Qty: 30 TABLET | Refills: 5 | Status: SHIPPED | OUTPATIENT
Start: 2020-11-04 | End: 2022-03-09

## 2020-11-05 ENCOUNTER — TELEPHONE (OUTPATIENT)
Dept: HEMATOLOGY/ONCOLOGY | Facility: CLINIC | Age: 65
End: 2020-11-05

## 2020-11-12 ENCOUNTER — INFUSION (OUTPATIENT)
Dept: INFUSION THERAPY | Facility: HOSPITAL | Age: 65
End: 2020-11-12
Attending: INTERNAL MEDICINE
Payer: COMMERCIAL

## 2020-11-12 VITALS
WEIGHT: 167.13 LBS | HEART RATE: 77 BPM | BODY MASS INDEX: 27.81 KG/M2 | OXYGEN SATURATION: 98 % | RESPIRATION RATE: 18 BRPM | DIASTOLIC BLOOD PRESSURE: 73 MMHG | SYSTOLIC BLOOD PRESSURE: 126 MMHG | TEMPERATURE: 97 F

## 2020-11-12 DIAGNOSIS — M81.0 SENILE OSTEOPOROSIS: Primary | ICD-10-CM

## 2020-11-12 PROCEDURE — 63600175 PHARM REV CODE 636 W HCPCS: Mod: JG | Performed by: INTERNAL MEDICINE

## 2020-11-12 PROCEDURE — 96372 THER/PROPH/DIAG INJ SC/IM: CPT

## 2020-11-12 RX ADMIN — DENOSUMAB 60 MG: 60 INJECTION SUBCUTANEOUS at 09:11

## 2020-11-12 NOTE — PLAN OF CARE
Problem: Fall Injury Risk  Goal: Absence of Fall and Fall-Related Injury  Outcome: Ongoing, Progressing  Intervention: Identify and Manage Contributors to Fall Injury Risk  Flowsheets (Taken 11/12/2020 0916)  Self-Care Promotion: independence encouraged  Medication Review/Management: medications reviewed

## 2020-12-08 ENCOUNTER — TELEPHONE (OUTPATIENT)
Dept: HEMATOLOGY/ONCOLOGY | Facility: CLINIC | Age: 65
End: 2020-12-08

## 2020-12-08 DIAGNOSIS — C49.A3 MALIGNANT GASTROINTESTINAL STROMAL TUMOR (GIST) OF SMALL INTESTINE: Primary | ICD-10-CM

## 2020-12-10 LAB
ALBUMIN SERPL-MCNC: 4.5 G/DL (ref 3.6–5.1)
ALBUMIN/GLOB SERPL: 2 (CALC) (ref 1–2.5)
ALP SERPL-CCNC: 77 U/L (ref 37–153)
ALT SERPL-CCNC: 28 U/L (ref 6–29)
AST SERPL-CCNC: 21 U/L (ref 10–35)
BASOPHILS # BLD AUTO: 49 CELLS/UL (ref 0–200)
BASOPHILS NFR BLD AUTO: 0.7 %
BILIRUB SERPL-MCNC: 0.7 MG/DL (ref 0.2–1.2)
BUN SERPL-MCNC: 13 MG/DL (ref 7–25)
BUN/CREAT SERPL: NORMAL (CALC) (ref 6–22)
CALCIUM SERPL-MCNC: 8.8 MG/DL (ref 8.6–10.4)
CHLORIDE SERPL-SCNC: 101 MMOL/L (ref 98–110)
CO2 SERPL-SCNC: 27 MMOL/L (ref 20–32)
CREAT SERPL-MCNC: 0.54 MG/DL (ref 0.5–0.99)
EOSINOPHIL # BLD AUTO: 70 CELLS/UL (ref 15–500)
EOSINOPHIL NFR BLD AUTO: 1 %
ERYTHROCYTE [DISTWIDTH] IN BLOOD BY AUTOMATED COUNT: 12.1 % (ref 11–15)
GFRSERPLBLD MDRD-ARVRAT: 100 ML/MIN/1.73M2
GLOBULIN SER CALC-MCNC: 2.3 G/DL (CALC) (ref 1.9–3.7)
GLUCOSE SERPL-MCNC: 91 MG/DL (ref 65–99)
HCT VFR BLD AUTO: 41.4 % (ref 35–45)
HGB BLD-MCNC: 13.6 G/DL (ref 11.7–15.5)
LYMPHOCYTES # BLD AUTO: 924 CELLS/UL (ref 850–3900)
LYMPHOCYTES NFR BLD AUTO: 13.2 %
MCH RBC QN AUTO: 29.8 PG (ref 27–33)
MCHC RBC AUTO-ENTMCNC: 32.9 G/DL (ref 32–36)
MCV RBC AUTO: 90.6 FL (ref 80–100)
MONOCYTES # BLD AUTO: 455 CELLS/UL (ref 200–950)
MONOCYTES NFR BLD AUTO: 6.5 %
NEUTROPHILS # BLD AUTO: 5502 CELLS/UL (ref 1500–7800)
NEUTROPHILS NFR BLD AUTO: 78.6 %
PLATELET # BLD AUTO: 291 THOUSAND/UL (ref 140–400)
PMV BLD REES-ECKER: 11.3 FL (ref 7.5–12.5)
POTASSIUM SERPL-SCNC: 3.8 MMOL/L (ref 3.5–5.3)
PROT SERPL-MCNC: 6.8 G/DL (ref 6.1–8.1)
RBC # BLD AUTO: 4.57 MILLION/UL (ref 3.8–5.1)
SODIUM SERPL-SCNC: 138 MMOL/L (ref 135–146)
WBC # BLD AUTO: 7 THOUSAND/UL (ref 3.8–10.8)

## 2020-12-16 ENCOUNTER — HOSPITAL ENCOUNTER (OUTPATIENT)
Dept: RADIOLOGY | Facility: HOSPITAL | Age: 65
Discharge: HOME | End: 2020-12-16
Attending: INTERNAL MEDICINE
Payer: COMMERCIAL

## 2020-12-16 DIAGNOSIS — R92.2 INCONCLUSIVE MAMMOGRAM DUE TO DENSE BREASTS: ICD-10-CM

## 2020-12-16 DIAGNOSIS — R92.30 INCONCLUSIVE MAMMOGRAM DUE TO DENSE BREASTS: ICD-10-CM

## 2020-12-16 DIAGNOSIS — C49.A3 MALIGNANT GASTROINTESTINAL STROMAL TUMOR (GIST) OF SMALL INTESTINE: ICD-10-CM

## 2020-12-16 PROCEDURE — 74177 CT ABD & PELVIS W/CONTRAST: CPT | Mod: TC

## 2020-12-16 PROCEDURE — 25500020 PHARM REV CODE 255: Performed by: INTERNAL MEDICINE

## 2020-12-16 RX ADMIN — IOHEXOL 100 ML: 350 INJECTION, SOLUTION INTRAVENOUS at 10:12

## 2021-02-27 LAB
ALBUMIN SERPL-MCNC: 4.5 G/DL (ref 3.6–5.1)
ALBUMIN/GLOB SERPL: 1.8 (CALC) (ref 1–2.5)
ALP SERPL-CCNC: 75 U/L (ref 37–153)
ALT SERPL-CCNC: 22 U/L (ref 6–29)
AST SERPL-CCNC: 19 U/L (ref 10–35)
BASOPHILS # BLD AUTO: 49 CELLS/UL (ref 0–200)
BASOPHILS NFR BLD AUTO: 1 %
BILIRUB SERPL-MCNC: 0.6 MG/DL (ref 0.2–1.2)
BUN SERPL-MCNC: 14 MG/DL (ref 7–25)
BUN/CREAT SERPL: NORMAL (CALC) (ref 6–22)
CALCIUM SERPL-MCNC: 9.2 MG/DL (ref 8.6–10.4)
CHLORIDE SERPL-SCNC: 101 MMOL/L (ref 98–110)
CO2 SERPL-SCNC: 28 MMOL/L (ref 20–32)
CREAT SERPL-MCNC: 0.62 MG/DL (ref 0.5–0.99)
EOSINOPHIL # BLD AUTO: 93 CELLS/UL (ref 15–500)
EOSINOPHIL NFR BLD AUTO: 1.9 %
ERYTHROCYTE [DISTWIDTH] IN BLOOD BY AUTOMATED COUNT: 12.5 % (ref 11–15)
GFRSERPLBLD MDRD-ARVRAT: 96 ML/MIN/1.73M2
GLOBULIN SER CALC-MCNC: 2.5 G/DL (CALC) (ref 1.9–3.7)
GLUCOSE SERPL-MCNC: 82 MG/DL (ref 65–99)
HCT VFR BLD AUTO: 42.9 % (ref 35–45)
HGB BLD-MCNC: 13.8 G/DL (ref 11.7–15.5)
LYMPHOCYTES # BLD AUTO: 911 CELLS/UL (ref 850–3900)
LYMPHOCYTES NFR BLD AUTO: 18.6 %
MCH RBC QN AUTO: 29.7 PG (ref 27–33)
MCHC RBC AUTO-ENTMCNC: 32.2 G/DL (ref 32–36)
MCV RBC AUTO: 92.5 FL (ref 80–100)
MONOCYTES # BLD AUTO: 382 CELLS/UL (ref 200–950)
MONOCYTES NFR BLD AUTO: 7.8 %
NEUTROPHILS # BLD AUTO: 3464 CELLS/UL (ref 1500–7800)
NEUTROPHILS NFR BLD AUTO: 70.7 %
PLATELET # BLD AUTO: 327 THOUSAND/UL (ref 140–400)
PMV BLD REES-ECKER: 10.9 FL (ref 7.5–12.5)
POTASSIUM SERPL-SCNC: 4.3 MMOL/L (ref 3.5–5.3)
PROT SERPL-MCNC: 7 G/DL (ref 6.1–8.1)
RBC # BLD AUTO: 4.64 MILLION/UL (ref 3.8–5.1)
SODIUM SERPL-SCNC: 139 MMOL/L (ref 135–146)
WBC # BLD AUTO: 4.9 THOUSAND/UL (ref 3.8–10.8)

## 2021-03-03 ENCOUNTER — OFFICE VISIT (OUTPATIENT)
Dept: HEMATOLOGY/ONCOLOGY | Facility: CLINIC | Age: 66
End: 2021-03-03
Payer: COMMERCIAL

## 2021-03-03 DIAGNOSIS — C49.A3 MALIGNANT GASTROINTESTINAL STROMAL TUMOR (GIST) OF SMALL INTESTINE: ICD-10-CM

## 2021-03-03 DIAGNOSIS — Z91.89 INCREASED RISK OF BREAST CANCER: Primary | ICD-10-CM

## 2021-03-03 DIAGNOSIS — Z91.89 AT HIGH RISK FOR BREAST CANCER: ICD-10-CM

## 2021-03-03 PROCEDURE — 99214 OFFICE O/P EST MOD 30 MIN: CPT | Mod: S$GLB,,, | Performed by: INTERNAL MEDICINE

## 2021-03-03 PROCEDURE — 99214 PR OFFICE/OUTPT VISIT, EST, LEVL IV, 30-39 MIN: ICD-10-PCS | Mod: S$GLB,,, | Performed by: INTERNAL MEDICINE

## 2021-04-26 ENCOUNTER — TELEPHONE (OUTPATIENT)
Dept: HEMATOLOGY/ONCOLOGY | Facility: CLINIC | Age: 66
End: 2021-04-26

## 2021-05-13 ENCOUNTER — INFUSION (OUTPATIENT)
Dept: INFUSION THERAPY | Facility: HOSPITAL | Age: 66
End: 2021-05-13
Attending: INTERNAL MEDICINE
Payer: COMMERCIAL

## 2021-05-13 VITALS
TEMPERATURE: 98 F | BODY MASS INDEX: 26.19 KG/M2 | RESPIRATION RATE: 18 BRPM | HEART RATE: 87 BPM | SYSTOLIC BLOOD PRESSURE: 122 MMHG | DIASTOLIC BLOOD PRESSURE: 78 MMHG | OXYGEN SATURATION: 98 % | WEIGHT: 157.38 LBS

## 2021-05-13 DIAGNOSIS — M81.0 SENILE OSTEOPOROSIS: Primary | ICD-10-CM

## 2021-05-13 PROCEDURE — 96372 THER/PROPH/DIAG INJ SC/IM: CPT

## 2021-05-13 PROCEDURE — 63600175 PHARM REV CODE 636 W HCPCS: Mod: JG | Performed by: INTERNAL MEDICINE

## 2021-05-13 RX ADMIN — DENOSUMAB 60 MG: 60 INJECTION SUBCUTANEOUS at 09:05

## 2021-05-25 ENCOUNTER — TELEPHONE (OUTPATIENT)
Dept: HEMATOLOGY/ONCOLOGY | Facility: CLINIC | Age: 66
End: 2021-05-25

## 2021-09-03 LAB
ALBUMIN SERPL-MCNC: 4.3 G/DL (ref 3.6–5.1)
ALBUMIN/GLOB SERPL: 2.3 (CALC) (ref 1–2.5)
ALP SERPL-CCNC: 63 U/L (ref 37–153)
ALT SERPL-CCNC: 17 U/L (ref 6–29)
AST SERPL-CCNC: 15 U/L (ref 10–35)
BASOPHILS # BLD AUTO: 63 CELLS/UL (ref 0–200)
BASOPHILS NFR BLD AUTO: 1.1 %
BILIRUB SERPL-MCNC: 0.6 MG/DL (ref 0.2–1.2)
BUN SERPL-MCNC: 17 MG/DL (ref 7–25)
BUN/CREAT SERPL: ABNORMAL (CALC) (ref 6–22)
CALCIUM SERPL-MCNC: 8.2 MG/DL (ref 8.6–10.4)
CHLORIDE SERPL-SCNC: 99 MMOL/L (ref 98–110)
CO2 SERPL-SCNC: 27 MMOL/L (ref 20–32)
CREAT SERPL-MCNC: 0.5 MG/DL (ref 0.5–0.99)
EOSINOPHIL # BLD AUTO: 80 CELLS/UL (ref 15–500)
EOSINOPHIL NFR BLD AUTO: 1.4 %
ERYTHROCYTE [DISTWIDTH] IN BLOOD BY AUTOMATED COUNT: 12.2 % (ref 11–15)
GLOBULIN SER CALC-MCNC: 1.9 G/DL (CALC) (ref 1.9–3.7)
GLUCOSE SERPL-MCNC: 75 MG/DL (ref 65–99)
HCT VFR BLD AUTO: 39.1 % (ref 35–45)
HGB BLD-MCNC: 12.5 G/DL (ref 11.7–15.5)
LYMPHOCYTES # BLD AUTO: 1003 CELLS/UL (ref 850–3900)
LYMPHOCYTES NFR BLD AUTO: 17.6 %
MCH RBC QN AUTO: 30 PG (ref 27–33)
MCHC RBC AUTO-ENTMCNC: 32 G/DL (ref 32–36)
MCV RBC AUTO: 94 FL (ref 80–100)
MONOCYTES # BLD AUTO: 393 CELLS/UL (ref 200–950)
MONOCYTES NFR BLD AUTO: 6.9 %
NEUTROPHILS # BLD AUTO: 4161 CELLS/UL (ref 1500–7800)
NEUTROPHILS NFR BLD AUTO: 73 %
PLATELET # BLD AUTO: 269 THOUSAND/UL (ref 140–400)
PMV BLD REES-ECKER: 10.7 FL (ref 7.5–12.5)
POTASSIUM SERPL-SCNC: 3.7 MMOL/L (ref 3.5–5.3)
PROT SERPL-MCNC: 6.2 G/DL (ref 6.1–8.1)
RBC # BLD AUTO: 4.16 MILLION/UL (ref 3.8–5.1)
SODIUM SERPL-SCNC: 136 MMOL/L (ref 135–146)
WBC # BLD AUTO: 5.7 THOUSAND/UL (ref 3.8–10.8)

## 2021-09-08 ENCOUNTER — OFFICE VISIT (OUTPATIENT)
Dept: HEMATOLOGY/ONCOLOGY | Facility: CLINIC | Age: 66
End: 2021-09-08
Payer: COMMERCIAL

## 2021-09-08 VITALS
RESPIRATION RATE: 18 BRPM | WEIGHT: 158.38 LBS | SYSTOLIC BLOOD PRESSURE: 120 MMHG | HEART RATE: 76 BPM | TEMPERATURE: 98 F | DIASTOLIC BLOOD PRESSURE: 81 MMHG | BODY MASS INDEX: 26.36 KG/M2

## 2021-09-08 DIAGNOSIS — C49.A3 MALIGNANT GASTROINTESTINAL STROMAL TUMOR (GIST) OF SMALL INTESTINE: ICD-10-CM

## 2021-09-08 PROCEDURE — 3079F DIAST BP 80-89 MM HG: CPT | Mod: S$GLB,,, | Performed by: INTERNAL MEDICINE

## 2021-09-08 PROCEDURE — 3074F PR MOST RECENT SYSTOLIC BLOOD PRESSURE < 130 MM HG: ICD-10-PCS | Mod: S$GLB,,, | Performed by: INTERNAL MEDICINE

## 2021-09-08 PROCEDURE — 3079F PR MOST RECENT DIASTOLIC BLOOD PRESSURE 80-89 MM HG: ICD-10-PCS | Mod: S$GLB,,, | Performed by: INTERNAL MEDICINE

## 2021-09-08 PROCEDURE — 99213 PR OFFICE/OUTPT VISIT, EST, LEVL III, 20-29 MIN: ICD-10-PCS | Mod: S$GLB,,, | Performed by: INTERNAL MEDICINE

## 2021-09-08 PROCEDURE — 3008F PR BODY MASS INDEX (BMI) DOCUMENTED: ICD-10-PCS | Mod: S$GLB,,, | Performed by: INTERNAL MEDICINE

## 2021-09-08 PROCEDURE — 3008F BODY MASS INDEX DOCD: CPT | Mod: S$GLB,,, | Performed by: INTERNAL MEDICINE

## 2021-09-08 PROCEDURE — 3074F SYST BP LT 130 MM HG: CPT | Mod: S$GLB,,, | Performed by: INTERNAL MEDICINE

## 2021-09-08 PROCEDURE — 99213 OFFICE O/P EST LOW 20 MIN: CPT | Mod: S$GLB,,, | Performed by: INTERNAL MEDICINE

## 2021-11-15 ENCOUNTER — INFUSION (OUTPATIENT)
Dept: INFUSION THERAPY | Facility: HOSPITAL | Age: 66
End: 2021-11-15
Attending: INTERNAL MEDICINE
Payer: COMMERCIAL

## 2021-11-15 VITALS
OXYGEN SATURATION: 97 % | BODY MASS INDEX: 26.79 KG/M2 | RESPIRATION RATE: 18 BRPM | WEIGHT: 161 LBS | TEMPERATURE: 98 F | HEART RATE: 82 BPM

## 2021-11-15 DIAGNOSIS — M81.0 SENILE OSTEOPOROSIS: Primary | ICD-10-CM

## 2021-11-15 PROCEDURE — 96372 THER/PROPH/DIAG INJ SC/IM: CPT

## 2021-11-15 PROCEDURE — 63600175 PHARM REV CODE 636 W HCPCS: Mod: JG | Performed by: INTERNAL MEDICINE

## 2021-11-15 RX ADMIN — DENOSUMAB 60 MG: 60 INJECTION SUBCUTANEOUS at 01:11

## 2022-03-03 ENCOUNTER — HOSPITAL ENCOUNTER (OUTPATIENT)
Dept: RADIOLOGY | Facility: HOSPITAL | Age: 67
Discharge: HOME | End: 2022-03-03
Attending: INTERNAL MEDICINE
Payer: MEDICARE

## 2022-03-03 DIAGNOSIS — C49.A3 MALIGNANT GASTROINTESTINAL STROMAL TUMOR (GIST) OF SMALL INTESTINE: ICD-10-CM

## 2022-03-03 PROCEDURE — 74177 CT ABD & PELVIS W/CONTRAST: CPT | Mod: TC

## 2022-03-03 PROCEDURE — 25500020 PHARM REV CODE 255: Performed by: INTERNAL MEDICINE

## 2022-03-03 RX ADMIN — IOHEXOL 100 ML: 350 INJECTION, SOLUTION INTRAVENOUS at 09:03

## 2022-03-05 LAB
ALBUMIN SERPL-MCNC: 4.3 G/DL (ref 3.6–5.1)
ALBUMIN/GLOB SERPL: 1.9 (CALC) (ref 1–2.5)
ALP SERPL-CCNC: 69 U/L (ref 37–153)
ALT SERPL-CCNC: 26 U/L (ref 6–29)
AST SERPL-CCNC: 18 U/L (ref 10–35)
BASOPHILS # BLD AUTO: 53 CELLS/UL (ref 0–200)
BASOPHILS NFR BLD AUTO: 0.9 %
BILIRUB SERPL-MCNC: 0.6 MG/DL (ref 0.2–1.2)
BUN SERPL-MCNC: 11 MG/DL (ref 7–25)
BUN/CREAT SERPL: NORMAL (CALC) (ref 6–22)
CALCIUM SERPL-MCNC: 8.7 MG/DL (ref 8.6–10.4)
CHLORIDE SERPL-SCNC: 102 MMOL/L (ref 98–110)
CO2 SERPL-SCNC: 30 MMOL/L (ref 20–32)
CREAT SERPL-MCNC: 0.55 MG/DL (ref 0.5–0.99)
EOSINOPHIL # BLD AUTO: 153 CELLS/UL (ref 15–500)
EOSINOPHIL NFR BLD AUTO: 2.6 %
ERYTHROCYTE [DISTWIDTH] IN BLOOD BY AUTOMATED COUNT: 12.4 % (ref 11–15)
GLOBULIN SER CALC-MCNC: 2.3 G/DL (CALC) (ref 1.9–3.7)
GLUCOSE SERPL-MCNC: 90 MG/DL (ref 65–99)
HCT VFR BLD AUTO: 38.9 % (ref 35–45)
HGB BLD-MCNC: 12.2 G/DL (ref 11.7–15.5)
LYMPHOCYTES # BLD AUTO: 926 CELLS/UL (ref 850–3900)
LYMPHOCYTES NFR BLD AUTO: 15.7 %
MCH RBC QN AUTO: 29.3 PG (ref 27–33)
MCHC RBC AUTO-ENTMCNC: 31.4 G/DL (ref 32–36)
MCV RBC AUTO: 93.3 FL (ref 80–100)
MONOCYTES # BLD AUTO: 472 CELLS/UL (ref 200–950)
MONOCYTES NFR BLD AUTO: 8 %
NEUTROPHILS # BLD AUTO: 4295 CELLS/UL (ref 1500–7800)
NEUTROPHILS NFR BLD AUTO: 72.8 %
PLATELET # BLD AUTO: 278 THOUSAND/UL (ref 140–400)
PMV BLD REES-ECKER: 10.7 FL (ref 7.5–12.5)
POTASSIUM SERPL-SCNC: 3.7 MMOL/L (ref 3.5–5.3)
PROT SERPL-MCNC: 6.6 G/DL (ref 6.1–8.1)
RBC # BLD AUTO: 4.17 MILLION/UL (ref 3.8–5.1)
SODIUM SERPL-SCNC: 139 MMOL/L (ref 135–146)
WBC # BLD AUTO: 5.9 THOUSAND/UL (ref 3.8–10.8)

## 2022-03-09 ENCOUNTER — OFFICE VISIT (OUTPATIENT)
Dept: HEMATOLOGY/ONCOLOGY | Facility: CLINIC | Age: 67
End: 2022-03-09
Payer: MEDICARE

## 2022-03-09 ENCOUNTER — TELEPHONE (OUTPATIENT)
Dept: HEMATOLOGY/ONCOLOGY | Facility: CLINIC | Age: 67
End: 2022-03-09

## 2022-03-09 VITALS
HEART RATE: 83 BPM | DIASTOLIC BLOOD PRESSURE: 82 MMHG | SYSTOLIC BLOOD PRESSURE: 132 MMHG | TEMPERATURE: 97 F | HEIGHT: 65 IN | RESPIRATION RATE: 18 BRPM | BODY MASS INDEX: 26.79 KG/M2

## 2022-03-09 DIAGNOSIS — M54.9 MID BACK PAIN: ICD-10-CM

## 2022-03-09 DIAGNOSIS — C49.A3 MALIGNANT GASTROINTESTINAL STROMAL TUMOR (GIST) OF SMALL INTESTINE: ICD-10-CM

## 2022-03-09 PROCEDURE — 99213 OFFICE O/P EST LOW 20 MIN: CPT | Mod: S$GLB,,, | Performed by: INTERNAL MEDICINE

## 2022-03-09 PROCEDURE — 99213 PR OFFICE/OUTPT VISIT, EST, LEVL III, 20-29 MIN: ICD-10-PCS | Mod: S$GLB,,, | Performed by: INTERNAL MEDICINE

## 2022-03-09 NOTE — ASSESSMENT & PLAN NOTE
Patient has very significant back pain and has lower spine disease.  I discussed the cause of this and don't think this is fibromyalgia.  Will arrange an MRI to be done and can refer depending on the result here.

## 2022-03-09 NOTE — ASSESSMENT & PLAN NOTE
CT scan of the abdomen looks ok with no sign of recurrence.  She can continue with yearly imaging for this at this point.  Will see her again in six months however.

## 2022-03-09 NOTE — PROGRESS NOTES
PROGRESS NOTE    Subjective:       Patient ID: Dai Cummings is a 66 y.o. female.    Chief Complaint:  No chief complaint on file.  follow up for GIST    History of Present Illness:   Dai Cummings is a 66 y.o. female who presents for routine follow up of GIST tumor.     Ms. Cummings complains of burning pain in her mid-back area which she has been attributing to fibromyalgia.        Had covid vaccine    CT abd/p impression 3/3/2022:  negative    EUS 12/3/2019:  There was no sign of significant endosonographic abnormality in the        neck / body / tail pancreas. The pancreatic duct was regular in        contour.       Lesion of interest on CT could not be identified on EUS due to        gastric sleeve and also loop of bowel located between gastric lumen        and area of interest. Attempted EUS from transjejunal views also        unsuccessful.      Ct Abd/P Impression 11/1/2019:  1. Stable size and appearance of 3 cm mass along the anterior aspect of the pancreatic head, consistent with known gastrointestinal stromal tumor.  2. No findings of metastatic disease in the abdomen or pelvis.      CT abd/p 11/8/2018 impression:  IMPRESSION:  1. Stable 3 cm enhancing mass with internal calcification adjacent to the  pancreas, compatible with known gastrointestinal stromal tumor.  2. No findings of metastatic disease in the abdomen or the pelvis.  3. Left mid abdominal small bowel intussusception, which is very likely  transient and of doubtful clinical significance.  4. Additional stable observations as above.      Family and Social history reviewed and is unchanged from 8/12/2013      ROS:  Review of Systems   Constitutional: Negative for appetite change, fever and unexpected weight change.   HENT: Negative for mouth sores.    Eyes: Negative for visual disturbance.   Respiratory: Negative for cough and shortness of breath.    Cardiovascular: Negative for chest  "pain and leg swelling.   Gastrointestinal: Negative for abdominal pain, blood in stool and diarrhea.   Genitourinary: Negative for frequency and hematuria.   Musculoskeletal: Positive for back pain.   Skin: Negative for rash.   Neurological: Negative for headaches.   Hematological: Negative for adenopathy.   Psychiatric/Behavioral: The patient is not nervous/anxious.           Current Outpatient Medications:     biotin 10,000 mcg Cap, Take by mouth., Disp: , Rfl:     calcium carbonate (OS-SHAYAN) 600 mg calcium (1,500 mg) Tab, Take 600 mg by mouth once., Disp: , Rfl:     cholecalciferol, vitamin D3, (VITAMIN D3 ORAL), Take by mouth., Disp: , Rfl:     clobetasoL (TEMOVATE) 0.05 % cream, aaa bid x 1-2 wks max, Disp: 30 g, Rfl: 0    cycloSPORINE (RESTASIS) 0.05 % ophthalmic emulsion, 1 drop 2 (two) times daily., Disp: , Rfl:     desoximetasone (TOPICORT) 0.25 % cream, Apply to rash on bid bid, Disp: 60 g, Rfl: 0    famotidine (PEPCID) 40 MG tablet, famotidine 40 mg tablet  TK 1 T PO QD, Disp: , Rfl:     loratadine (CLARITIN) 10 mg tablet, Take 10 mg by mouth., Disp: , Rfl:     loratadine-pseudoephedrine 5-120 mg (CLARITIN-D 12-HOUR) 5-120 mg per tablet, Take by mouth., Disp: , Rfl:     multivitamin (THERAGRAN) per tablet, Take 1 tablet by mouth., Disp: , Rfl:     pantoprazole (PROTONIX) 40 MG tablet, Take 40 mg by mouth., Disp: , Rfl:     phentermine (ADIPEX-P) 37.5 mg tablet, Take 37.5 mg by mouth every morning., Disp: , Rfl:     polyethylene glycol 3350 (MIRALAX ORAL), Take by mouth., Disp: , Rfl:     zinc 50 mg Tab, , Disp: , Rfl:         Objective:       Physical Examination:     /82   Pulse 83   Temp 97.2 °F (36.2 °C)   Resp 18   Ht 5' 5" (1.651 m)   BMI 26.79 kg/m²     GEN: no apparent distress, comfortable; AAOx3  HEAD: atraumatic and normocephalic  EYES: no pallor, no icterus, PERRLA  ENT: external ears WNL; no nasal discharge  NECK: no visible masses, trachea midline  CHEST: Normal " respiratory effort  ABDOM: Visibly Flat  SKIN: no visible rashes  NEURO: grossly intact; motor/sensory WNL; AAOx3; no tremors  PSYCH: normal mood, affect and behavior        Labs:   Recent Results (from the past 336 hour(s))   CBC Auto Differential    Collection Time: 03/04/22 12:00 AM   Result Value Ref Range    WBC 5.9 3.8 - 10.8 Thousand/uL    Hemoglobin 12.2 11.7 - 15.5 g/dL    Hematocrit 38.9 35.0 - 45.0 %    Platelets 278 140 - 400 Thousand/uL   CBC Auto Differential    Collection Time: 03/03/22  8:28 AM   Result Value Ref Range    WBC 7.30 3.90 - 12.70 K/uL    Hemoglobin 12.5 12.0 - 16.0 g/dL    Hematocrit 40.6 37.0 - 48.5 %    Platelets 290 150 - 450 K/uL     CMP  Sodium   Date Value Ref Range Status   03/04/2022 139 135 - 146 mmol/L Final     Potassium   Date Value Ref Range Status   03/04/2022 3.7 3.5 - 5.3 mmol/L Final     Chloride   Date Value Ref Range Status   03/04/2022 102 98 - 110 mmol/L Final     CO2   Date Value Ref Range Status   03/04/2022 30 20 - 32 mmol/L Final     Glucose   Date Value Ref Range Status   03/04/2022 90 65 - 99 mg/dL Final     Comment:                   Fasting reference interval          BUN   Date Value Ref Range Status   03/04/2022 11 7 - 25 mg/dL Final     Creatinine   Date Value Ref Range Status   03/04/2022 0.55 0.50 - 0.99 mg/dL Final     Comment:     For patients >49 years of age, the reference limit  for Creatinine is approximately 13% higher for people  identified as -American.          Calcium   Date Value Ref Range Status   03/04/2022 8.7 8.6 - 10.4 mg/dL Final     Total Protein   Date Value Ref Range Status   03/04/2022 6.6 6.1 - 8.1 g/dL Final     Albumin   Date Value Ref Range Status   03/04/2022 4.3 3.6 - 5.1 g/dL Final     Total Bilirubin   Date Value Ref Range Status   03/04/2022 0.6 0.2 - 1.2 mg/dL Final     Alkaline Phosphatase   Date Value Ref Range Status   03/03/2022 60 55 - 135 U/L Final     AST   Date Value Ref Range Status   03/04/2022 18 10 - 35  U/L Final     ALT   Date Value Ref Range Status   03/04/2022 26 6 - 29 U/L Final     Anion Gap   Date Value Ref Range Status   03/03/2022 10 8 - 16 mmol/L Final     eGFR if    Date Value Ref Range Status   03/04/2022 115 > OR = 60 mL/min/1.73m2 Final     eGFR if non    Date Value Ref Range Status   03/04/2022 99 > OR = 60 mL/min/1.73m2 Final     No results found for: CEA  No results found for: PSA        Assessment/Plan:     Problem List Items Addressed This Visit     Mid back pain     Patient has very significant back pain and has lower spine disease.  I discussed the cause of this and don't think this is fibromyalgia.  Will arrange an MRI to be done and can refer depending on the result here.             Relevant Orders    MRI Thoracic Spine W WO Contrast    Malignant gastrointestinal stromal tumor (GIST) of small intestine/pancreas     CT scan of the abdomen looks ok with no sign of recurrence.  She can continue with yearly imaging for this at this point.  Will see her again in six months however.             Relevant Orders    MRI Thoracic Spine W WO Contrast          Discussion:     Follow up in about 6 months (around 9/9/2022).      Electronically signed by John Díaz

## 2022-03-09 NOTE — TELEPHONE ENCOUNTER
Spoke to pt regarding the abnormality found on her scan discussed with Dr. Correa. The radiologist reviewed it and it is unchanged, so there are no new concerns. Pt verbalized understanding. No further questions.

## 2022-03-28 ENCOUNTER — HOSPITAL ENCOUNTER (OUTPATIENT)
Dept: RADIOLOGY | Facility: HOSPITAL | Age: 67
Discharge: HOME | End: 2022-03-28
Attending: INTERNAL MEDICINE
Payer: MEDICARE

## 2022-03-28 DIAGNOSIS — C49.A3 MALIGNANT GASTROINTESTINAL STROMAL TUMOR (GIST) OF SMALL INTESTINE: ICD-10-CM

## 2022-03-28 DIAGNOSIS — M54.9 MID BACK PAIN: ICD-10-CM

## 2022-03-28 PROCEDURE — 25500020 PHARM REV CODE 255: Performed by: INTERNAL MEDICINE

## 2022-03-28 PROCEDURE — A9585 GADOBUTROL INJECTION: HCPCS | Performed by: INTERNAL MEDICINE

## 2022-03-28 PROCEDURE — 72157 MRI CHEST SPINE W/O & W/DYE: CPT | Mod: TC

## 2022-03-28 RX ORDER — GADOBUTROL 604.72 MG/ML
7 INJECTION INTRAVENOUS
Status: COMPLETED | OUTPATIENT
Start: 2022-03-28 | End: 2022-03-28

## 2022-03-28 RX ADMIN — GADOBUTROL 7 ML: 604.72 INJECTION INTRAVENOUS at 03:03

## 2022-05-13 ENCOUNTER — TELEPHONE (OUTPATIENT)
Dept: INFUSION THERAPY | Facility: HOSPITAL | Age: 67
End: 2022-05-13

## 2022-05-13 NOTE — TELEPHONE ENCOUNTER
Left voicemail with call back number at Dr. Bhatia office to get new blood work, orders, office notes.

## 2022-05-16 ENCOUNTER — TELEPHONE (OUTPATIENT)
Dept: INFUSION THERAPY | Facility: HOSPITAL | Age: 67
End: 2022-05-16

## 2022-05-16 NOTE — TELEPHONE ENCOUNTER
Attempted to call patient to see if she has had lab work completed. Left voicemail with call back number. Will notify scheduling to reschedule patient until lab work is obtained.

## 2022-05-26 ENCOUNTER — INFUSION (OUTPATIENT)
Dept: INFUSION THERAPY | Facility: HOSPITAL | Age: 67
End: 2022-05-26
Attending: INTERNAL MEDICINE
Payer: MEDICARE

## 2022-05-26 VITALS
TEMPERATURE: 97 F | DIASTOLIC BLOOD PRESSURE: 82 MMHG | WEIGHT: 168.5 LBS | BODY MASS INDEX: 28.04 KG/M2 | HEART RATE: 99 BPM | SYSTOLIC BLOOD PRESSURE: 133 MMHG

## 2022-05-26 DIAGNOSIS — M81.0 SENILE OSTEOPOROSIS: Primary | ICD-10-CM

## 2022-05-26 PROCEDURE — 96372 THER/PROPH/DIAG INJ SC/IM: CPT

## 2022-05-26 PROCEDURE — 63600175 PHARM REV CODE 636 W HCPCS: Mod: JG | Performed by: INTERNAL MEDICINE

## 2022-05-26 RX ADMIN — DENOSUMAB 60 MG: 60 INJECTION SUBCUTANEOUS at 10:05

## 2022-05-26 NOTE — NURSING
INST PT ON IMP OF DAILY CA SUPPLEMENT..PT HAD RECENT DDS WORK , PRESENTS W/ CLEARANCE NOTE FROM DR. JJ TALBOT DDS, GIVING PERMISSION FOR PT TO RECEIVE PROLIA  INJECTION TODAY.

## 2022-08-19 ENCOUNTER — TELEPHONE (OUTPATIENT)
Dept: RHEUMATOLOGY | Facility: CLINIC | Age: 67
End: 2022-08-19
Payer: MEDICARE

## 2022-08-19 NOTE — TELEPHONE ENCOUNTER
Called patient no answer, left voice mail patient portal message sent as well received referral  will be sent for scanning  ----- Message from Roxana Jerome sent at 8/19/2022 11:08 AM CDT -----  Contact: [phone]  Type: Needs Medical Advice  Who Called: Pt     Best Call Back Number: [phone]    Additional Information: Pt is calling to schedule a New Pt appt. Pt states referral was sent in by Orlin Ozuna NP. Pls call back and advise.

## 2022-09-08 ENCOUNTER — OFFICE VISIT (OUTPATIENT)
Dept: HEMATOLOGY/ONCOLOGY | Facility: CLINIC | Age: 67
End: 2022-09-08
Payer: MEDICARE

## 2022-09-08 VITALS
BODY MASS INDEX: 28.91 KG/M2 | DIASTOLIC BLOOD PRESSURE: 64 MMHG | SYSTOLIC BLOOD PRESSURE: 131 MMHG | WEIGHT: 173.69 LBS | HEART RATE: 67 BPM | TEMPERATURE: 98 F

## 2022-09-08 DIAGNOSIS — C49.A3 MALIGNANT GASTROINTESTINAL STROMAL TUMOR (GIST) OF SMALL INTESTINE: ICD-10-CM

## 2022-09-08 PROCEDURE — 99213 OFFICE O/P EST LOW 20 MIN: CPT | Mod: S$GLB,,, | Performed by: INTERNAL MEDICINE

## 2022-09-08 PROCEDURE — 99213 PR OFFICE/OUTPT VISIT, EST, LEVL III, 20-29 MIN: ICD-10-PCS | Mod: S$GLB,,, | Performed by: INTERNAL MEDICINE

## 2022-09-08 NOTE — ASSESSMENT & PLAN NOTE
Patient is doing well from this standpoint and has no new concerning signs or symptoms at this time.  Will continue with yearly imaging and arrange this now.  She continues with work up for back pain but no metastatic disease is suspected.

## 2022-09-08 NOTE — PROGRESS NOTES
PROGRESS NOTE    Subjective:       Patient ID: Dai Cummings is a 66 y.o. female.    Chief Complaint:  No chief complaint on file.  follow up for GIST    History of Present Illness:   Dai Cummings is a 66 y.o. female who presents for routine follow up of GIST tumor.     Ms. Cummings still having back pain.  Being worked up through [facility].          Had covid vaccine    CT abd/p impression 3/3/2022:  negative    EUS 12/3/2019:  There was no sign of significant endosonographic abnormality in the        neck / body / tail pancreas. The pancreatic duct was regular in        contour.       Lesion of interest on CT could not be identified on EUS due to        gastric sleeve and also loop of bowel located between gastric lumen        and area of interest. Attempted EUS from transjejunal views also        unsuccessful.      Ct Abd/P Impression 11/1/2019:  1. Stable size and appearance of 3 cm mass along the anterior aspect of the pancreatic head, consistent with known gastrointestinal stromal tumor.  2. No findings of metastatic disease in the abdomen or pelvis.      CT abd/p 11/8/2018 impression:  IMPRESSION:  1. Stable 3 cm enhancing mass with internal calcification adjacent to the  pancreas, compatible with known gastrointestinal stromal tumor.  2. No findings of metastatic disease in the abdomen or the pelvis.  3. Left mid abdominal small bowel intussusception, which is very likely  transient and of doubtful clinical significance.  4. Additional stable observations as above.      Family and Social history reviewed and is unchanged from 8/12/2013      ROS:  Review of Systems   Constitutional:  Negative for appetite change, fever and unexpected weight change.   HENT:  Negative for mouth sores.    Eyes:  Negative for visual disturbance.   Respiratory:  Negative for cough and shortness of breath.    Cardiovascular:  Negative for chest pain and leg swelling.    Gastrointestinal:  Negative for abdominal pain, blood in stool and diarrhea.   Genitourinary:  Negative for frequency and hematuria.   Musculoskeletal:  Positive for back pain.   Skin:  Negative for rash.   Neurological:  Negative for headaches.   Hematological:  Negative for adenopathy.   Psychiatric/Behavioral:  The patient is not nervous/anxious.         Current Outpatient Medications:     biotin 10,000 mcg Cap, Take by mouth., Disp: , Rfl:     calcium carbonate (OS-SHAYAN) 600 mg calcium (1,500 mg) Tab, Take 600 mg by mouth once., Disp: , Rfl:     cetirizine 10 mg Cap, , Disp: , Rfl:     cholecalciferol, vitamin D3, (VITAMIN D3 ORAL), Take by mouth., Disp: , Rfl:     clobetasoL (TEMOVATE) 0.05 % cream, aaa bid x 2-3 wks, tk 1 wk off and repeat prn, Disp: 60 g, Rfl: 1    cycloSPORINE (RESTASIS) 0.05 % ophthalmic emulsion, 1 drop 2 (two) times daily., Disp: , Rfl:     famotidine (PEPCID) 40 MG tablet, famotidine 40 mg tablet  TK 1 T PO QD, Disp: , Rfl:     meloxicam (MOBIC) 15 MG tablet, Take 15 mg by mouth once daily., Disp: , Rfl:     mometasone (ELOCON) 0.1 % solution, USE 2 TO 3 DROPS IN EACH EAR AS NEEDED FOR ITCHING, Disp: , Rfl:     multivitamin (THERAGRAN) per tablet, Take 1 tablet by mouth., Disp: , Rfl:     pantoprazole (PROTONIX) 40 MG tablet, Take 40 mg by mouth., Disp: , Rfl:     phentermine (ADIPEX-P) 37.5 mg tablet, Take 37.5 mg by mouth every morning., Disp: , Rfl:     polyethylene glycol 3350 (MIRALAX ORAL), Take by mouth., Disp: , Rfl:         Objective:       Physical Examination:     /64   Pulse 67   Temp 98.1 °F (36.7 °C)   Wt 78.8 kg (173 lb 11.2 oz)   BMI 28.91 kg/m²     GEN: no apparent distress, comfortable; AAOx3  HEAD: atraumatic and normocephalic  EYES: no pallor, no icterus, PERRLA  ENT: external ears WNL; no nasal discharge  NECK: no visible masses, trachea midline  CHEST: Normal respiratory effort  ABDOM: Visibly Flat  SKIN: no visible rashes  NEURO: grossly intact;  motor/sensory WNL; AAOx3; no tremors  PSYCH: normal mood, affect and behavior        Labs:   No results found for this or any previous visit (from the past 336 hour(s)).    CMP  Sodium   Date Value Ref Range Status   03/04/2022 139 135 - 146 mmol/L Final     Potassium   Date Value Ref Range Status   03/04/2022 3.7 3.5 - 5.3 mmol/L Final     Chloride   Date Value Ref Range Status   03/04/2022 102 98 - 110 mmol/L Final     CO2   Date Value Ref Range Status   03/04/2022 30 20 - 32 mmol/L Final     Glucose   Date Value Ref Range Status   03/04/2022 90 65 - 99 mg/dL Final     Comment:                   Fasting reference interval          BUN   Date Value Ref Range Status   03/04/2022 11 7 - 25 mg/dL Final     Creatinine   Date Value Ref Range Status   03/04/2022 0.55 0.50 - 0.99 mg/dL Final     Comment:     For patients >49 years of age, the reference limit  for Creatinine is approximately 13% higher for people  identified as -American.          Calcium   Date Value Ref Range Status   03/04/2022 8.7 8.6 - 10.4 mg/dL Final     Total Protein   Date Value Ref Range Status   03/04/2022 6.6 6.1 - 8.1 g/dL Final     Albumin   Date Value Ref Range Status   03/04/2022 4.3 3.6 - 5.1 g/dL Final     Total Bilirubin   Date Value Ref Range Status   03/04/2022 0.6 0.2 - 1.2 mg/dL Final     Alkaline Phosphatase   Date Value Ref Range Status   03/03/2022 60 55 - 135 U/L Final     AST   Date Value Ref Range Status   03/04/2022 18 10 - 35 U/L Final     ALT   Date Value Ref Range Status   03/04/2022 26 6 - 29 U/L Final     Anion Gap   Date Value Ref Range Status   03/03/2022 10 8 - 16 mmol/L Final     eGFR if    Date Value Ref Range Status   03/04/2022 115 > OR = 60 mL/min/1.73m2 Final     eGFR if non    Date Value Ref Range Status   03/04/2022 99 > OR = 60 mL/min/1.73m2 Final     No results found for: CEA  No results found for: PSA        Assessment/Plan:     Problem List Items Addressed This Visit        Malignant gastrointestinal stromal tumor (GIST) of small intestine/pancreas     Patient is doing well from this standpoint and has no new concerning signs or symptoms at this time.  Will continue with yearly imaging and arrange this now.  She continues with work up for back pain but no metastatic disease is suspected.          Relevant Orders    CT Abdomen Pelvis With Contrast    CBC Auto Differential    Comprehensive Metabolic Panel       Discussion:     Follow up in about 6 months (around 3/8/2023).      Electronically signed by John Díaz

## 2022-10-18 ENCOUNTER — SPECIALTY PHARMACY (OUTPATIENT)
Dept: PHARMACY | Facility: CLINIC | Age: 67
End: 2022-10-18

## 2022-10-18 DIAGNOSIS — L40.0 PSORIASIS VULGARIS: Primary | ICD-10-CM

## 2022-10-19 NOTE — TELEPHONE ENCOUNTER
Open I-Vent d/t no full hepatitis panel recorded for patient. Only hep B surface antibody (non-reactive) on file, which does not encompass full spectrum of immunity and/or infection.     Awaiting [facility] response to input hep panel orders prior to therapy initiation.

## 2022-10-20 NOTE — TELEPHONE ENCOUNTER
[facility] office input new orders for Hep B panel. They contacted pt and she is aware per chart notes.     Will await Hep B panel results prior to PA submission.

## 2022-10-31 NOTE — TELEPHONE ENCOUNTER
Called patient and reminded her about Hep panel. She plans on going today or tomorrow. States she broke out in a rash from going to a conference but feeling better.     Will await Hep panel results to initiate PA.

## 2022-11-04 NOTE — TELEPHONE ENCOUNTER
Hep panel has resulted. Will proceed with submitting PA via Epic.     Will continue to answer questions and await determination.

## 2022-11-07 NOTE — TELEPHONE ENCOUNTER
PA approved for   Case ID: [id number]  From 11/04/2022 through 12/31/2039    Benefits Investigation:   MEDICARE PLAN: [facility]  Estimated copay: $3,371.64       Tier 5 medication  Benefits Stage: first fill --> catastrophic coverage  In Network: Yes, [facility] in-network   PA approval on file: No documented generated, see info above  LIS level: none    Fowarding to financial assistance (FA).

## 2022-11-07 NOTE — TELEPHONE ENCOUNTER
Outgoing call (LVM) to patient regarding Skyrizi prescription we received. Please inform patient Skyrizi has been approved through insurance with a high copay. No grants are currently available at this time but am able to apply patient for  assistance.     If patient agrees, please ask for consent, HH size, and annual income and requested to have pt portion sent either via mail or email. Will continue to follow up.

## 2022-11-11 NOTE — TELEPHONE ENCOUNTER
Outgoing call to patient to offer PAP for Skdmitryizi obtainment as copayment is high. Pt agreed and would like information sent to email on file. Email sent. Sent staff message and faxed provider portion to [phone].     Will continue to follow.

## 2022-11-16 NOTE — TELEPHONE ENCOUNTER
"Patient did receive email for PAP application but needs to figure out how to sign and upload it back. Instructed pt there is a  built into the "note" application on her phone that should allow her to do so. She states she may have a grandchild assist.     Will continue to follow - awaiting on both pt + provider portions of pap.   "

## 2022-11-18 NOTE — TELEPHONE ENCOUNTER
Received faxes from [employer] that the application for Skyrizi was submitted and they need additional information. Unsure who had processed as both pt and provider portions were not returned to [facility].     Per Catie at [facility] ([phone]), the provider portion has been received but needs demographic info of pt's address which was provided and the patient portion.     Will follow up with pt next week for her portion of application.

## 2022-11-23 NOTE — TELEPHONE ENCOUNTER
Received patient portion of application and faxing to [employer] [facility] [employer] ([facility] at [phone].     Will await PAP determination for Skyrizi.

## 2022-12-02 NOTE — TELEPHONE ENCOUNTER
Per Tony, at [facility] ([phone]) application is still being reviewed.     Called pt and she is aware they are still processing gerard.     Will await determination.

## 2022-12-05 ENCOUNTER — PATIENT MESSAGE (OUTPATIENT)
Dept: PHARMACY | Facility: CLINIC | Age: 67
End: 2022-12-05
Payer: MEDICARE

## 2022-12-05 NOTE — TELEPHONE ENCOUNTER
Patient Assistance Program (PAP) approved for Skyrizi  Financial Assistance for Skyrizi is approved from 12/02/2022 to 12/31/2023  Source: [facility] Assistance [facility]  Case ID: not given, please see approval document in   Phone: [phone]  Fax: [phone]   Pharmacy: [facility]    Spoke with pt and she aware of patient assistance approval. Will send [employer] message of their contact information as well and offer [facility] assistance for when renewal time comes.     Sent staff message of approval so [facility] aware.     Closing [facility] referral as pt utilizing PAP for Skyrizi.

## 2022-12-07 ENCOUNTER — PATIENT MESSAGE (OUTPATIENT)
Dept: HEMATOLOGY/ONCOLOGY | Facility: CLINIC | Age: 67
End: 2022-12-07

## 2022-12-08 ENCOUNTER — INFUSION (OUTPATIENT)
Dept: INFUSION THERAPY | Facility: HOSPITAL | Age: 67
End: 2022-12-08
Attending: INTERNAL MEDICINE
Payer: MEDICARE

## 2022-12-08 VITALS
HEART RATE: 70 BPM | TEMPERATURE: 97 F | RESPIRATION RATE: 18 BRPM | BODY MASS INDEX: 29.79 KG/M2 | HEIGHT: 65 IN | WEIGHT: 178.81 LBS | OXYGEN SATURATION: 99 % | DIASTOLIC BLOOD PRESSURE: 99 MMHG | SYSTOLIC BLOOD PRESSURE: 150 MMHG

## 2022-12-08 DIAGNOSIS — M81.0 SENILE OSTEOPOROSIS: Primary | ICD-10-CM

## 2022-12-08 PROCEDURE — 63600175 PHARM REV CODE 636 W HCPCS: Mod: JG | Performed by: INTERNAL MEDICINE

## 2022-12-08 PROCEDURE — 96372 THER/PROPH/DIAG INJ SC/IM: CPT

## 2022-12-08 RX ADMIN — DENOSUMAB 60 MG: 60 INJECTION SUBCUTANEOUS at 10:12

## 2022-12-08 NOTE — PLAN OF CARE
Problem: Fatigue  Goal: Improved Activity Tolerance  Outcome: Ongoing, Progressing  Intervention: Promote Improved Energy  Flowsheets (Taken 12/8/2022 1004)  Fatigue Management:   fatigue-related activity identified   paced activity encouraged   frequent rest breaks encouraged  Sleep/Rest Enhancement:   noise level reduced   relaxation techniques promoted   regular sleep/rest pattern promoted

## 2023-01-03 DIAGNOSIS — Z91.89 PERSONAL HISTORY OF POISONING, PRESENTING HAZARDS TO HEALTH: Primary | ICD-10-CM

## 2023-03-02 ENCOUNTER — HOSPITAL ENCOUNTER (OUTPATIENT)
Dept: RADIOLOGY | Facility: HOSPITAL | Age: 68
Discharge: HOME | End: 2023-03-02
Attending: INTERNAL MEDICINE
Payer: MEDICARE

## 2023-03-02 DIAGNOSIS — C49.A3 MALIGNANT GASTROINTESTINAL STROMAL TUMOR (GIST) OF SMALL INTESTINE: ICD-10-CM

## 2023-03-02 PROCEDURE — 25500020 PHARM REV CODE 255: Performed by: INTERNAL MEDICINE

## 2023-03-02 PROCEDURE — 74177 CT ABD & PELVIS W/CONTRAST: CPT | Mod: TC

## 2023-03-02 RX ADMIN — IOHEXOL 100 ML: 350 INJECTION, SOLUTION INTRAVENOUS at 08:03

## 2023-03-16 ENCOUNTER — OFFICE VISIT (OUTPATIENT)
Dept: HEMATOLOGY/ONCOLOGY | Facility: CLINIC | Age: 68
End: 2023-03-16
Payer: MEDICARE

## 2023-03-16 VITALS
SYSTOLIC BLOOD PRESSURE: 142 MMHG | WEIGHT: 176.38 LBS | BODY MASS INDEX: 29.35 KG/M2 | TEMPERATURE: 98 F | HEART RATE: 63 BPM | DIASTOLIC BLOOD PRESSURE: 69 MMHG

## 2023-03-16 DIAGNOSIS — C49.A3 MALIGNANT GASTROINTESTINAL STROMAL TUMOR (GIST) OF SMALL INTESTINE: ICD-10-CM

## 2023-03-16 PROCEDURE — 99213 PR OFFICE/OUTPT VISIT, EST, LEVL III, 20-29 MIN: ICD-10-PCS | Mod: S$GLB,,, | Performed by: INTERNAL MEDICINE

## 2023-03-16 PROCEDURE — 99213 OFFICE O/P EST LOW 20 MIN: CPT | Mod: S$GLB,,, | Performed by: INTERNAL MEDICINE

## 2023-03-16 NOTE — PROGRESS NOTES
PROGRESS NOTE    Subjective:       Patient ID: Dai Cummings is a 67 y.o. female.    Chief Complaint:  No chief complaint on file.    follow up for GIST    History of Present Illness:   Dai Cummings is a 67 y.o. female who presents for routine follow up of GIST tumor.     Ms. Cummings doing ok today.  No new complaints.  Has continued chronic abdominal pain.     3/2/2023 CT abd/p with contrast:  1.  No acute intra-abdominal abnormality observed.  2.  Moderate size hiatal hernia with gastric postsurgical changes.  3.  Additional incidental observations as described.    CT abd/p impression 3/3/2022:  negative    EUS 12/3/2019:  There was no sign of significant endosonographic abnormality in the        neck / body / tail pancreas. The pancreatic duct was regular in        contour.       Lesion of interest on CT could not be identified on EUS due to        gastric sleeve and also loop of bowel located between gastric lumen        and area of interest. Attempted EUS from transjejunal views also        unsuccessful.      Ct Abd/P Impression 11/1/2019:  1. Stable size and appearance of 3 cm mass along the anterior aspect of the pancreatic head, consistent with known gastrointestinal stromal tumor.  2. No findings of metastatic disease in the abdomen or pelvis.      CT abd/p 11/8/2018 impression:  IMPRESSION:  1. Stable 3 cm enhancing mass with internal calcification adjacent to the  pancreas, compatible with known gastrointestinal stromal tumor.  2. No findings of metastatic disease in the abdomen or the pelvis.  3. Left mid abdominal small bowel intussusception, which is very likely  transient and of doubtful clinical significance.  4. Additional stable observations as above.      Family and Social history reviewed and is unchanged from 8/12/2013      ROS:  Review of Systems   Constitutional:  Negative for appetite change, fever and unexpected weight change.    HENT:  Negative for mouth sores.    Eyes:  Negative for visual disturbance.   Respiratory:  Negative for cough and shortness of breath.    Cardiovascular:  Negative for chest pain and leg swelling.   Gastrointestinal:  Negative for abdominal pain, blood in stool and diarrhea.   Genitourinary:  Negative for frequency and hematuria.   Musculoskeletal:  Positive for back pain.   Skin:  Negative for rash.   Neurological:  Negative for headaches.   Hematological:  Negative for adenopathy.   Psychiatric/Behavioral:  The patient is not nervous/anxious.         Current Outpatient Medications:     augmented betamethasone dipropionate (DIPROLENE-AF) 0.05 % cream, AAA BID X 1-2 WKS, Disp: 50 g, Rfl: 3    biotin 10,000 mcg Cap, Take by mouth., Disp: , Rfl:     calcium carbonate (OS-SHAYAN) 600 mg calcium (1,500 mg) Tab, Take 600 mg by mouth once., Disp: , Rfl:     cetirizine 10 mg Cap, , Disp: , Rfl:     cholecalciferol, vitamin D3, (VITAMIN D3 ORAL), Take by mouth., Disp: , Rfl:     cycloSPORINE (RESTASIS) 0.05 % ophthalmic emulsion, 1 drop 2 (two) times daily., Disp: , Rfl:     denosumab (PROLIA) 60 mg/mL Syrg, , Disp: , Rfl:     famotidine (PEPCID) 40 MG tablet, famotidine 40 mg tablet  TK 1 T PO QD, Disp: , Rfl:     gabapentin (NEURONTIN) 600 MG tablet, , Disp: , Rfl:     multivit-min/ferrous fumarate (MULTI VITAMIN ORAL), Multi Vitamin, Disp: , Rfl:     multivitamin (THERAGRAN) per tablet, Take 1 tablet by mouth., Disp: , Rfl:     pantoprazole (PROTONIX) 40 MG tablet, Take 40 mg by mouth., Disp: , Rfl:     phentermine (ADIPEX-P) 37.5 mg tablet, Take 37.5 mg by mouth every morning., Disp: , Rfl:     polyethylene glycol 3350 (MIRALAX ORAL), Take by mouth., Disp: , Rfl:     predniSONE (DELTASONE) 20 MG tablet, Take 2 pills po qam with breakfast x 1 wk then take 1 po qam with breakfast x 1 wk then take 1/2 po qam with breakfast x 6 days, Disp: 24 tablet, Rfl: 0    risankizumab-rzaa (SKYRIZI) 150 mg/mL PnIj, , Disp: , Rfl:          Objective:       Physical Examination:     BP (!) 142/69   Pulse 63   Temp 97.7 °F (36.5 °C)   Wt 80 kg (176 lb 6.4 oz)   BMI 29.35 kg/m²     GEN: no apparent distress, comfortable; AAOx3  HEAD: atraumatic and normocephalic  EYES: no pallor, no icterus, PERRLA  ENT: external ears WNL; no nasal discharge  NECK: no visible masses, trachea midline  CHEST: Normal respiratory effort  ABDOM: Visibly Flat  SKIN: no visible rashes  NEURO: grossly intact; motor/sensory WNL; AAOx3; no tremors  PSYCH: normal mood, affect and behavior        Labs:   No results found for this or any previous visit (from the past 336 hour(s)).    CMP  Sodium   Date Value Ref Range Status   03/02/2023 140 136 - 145 mmol/L Final     Potassium   Date Value Ref Range Status   03/02/2023 4.1 3.5 - 5.1 mmol/L Final     Chloride   Date Value Ref Range Status   03/02/2023 103 95 - 110 mmol/L Final     CO2   Date Value Ref Range Status   03/02/2023 28 23 - 29 mmol/L Final     Glucose   Date Value Ref Range Status   03/02/2023 91 70 - 110 mg/dL Final     BUN   Date Value Ref Range Status   03/02/2023 13 8 - 23 mg/dL Final     Creatinine   Date Value Ref Range Status   03/02/2023 0.5 0.5 - 1.4 mg/dL Final     Calcium   Date Value Ref Range Status   03/02/2023 9.1 8.7 - 10.5 mg/dL Final     Total Protein   Date Value Ref Range Status   03/02/2023 7.2 6.0 - 8.4 g/dL Final     Albumin   Date Value Ref Range Status   03/02/2023 4.3 3.5 - 5.2 g/dL Final     Total Bilirubin   Date Value Ref Range Status   03/02/2023 0.7 0.1 - 1.0 mg/dL Final     Comment:     For infants and newborns, interpretation of results should be based  on gestational age, weight and in agreement with clinical  observations.    Premature Infant recommended reference ranges:  Up to 24 hours.............<8.0 mg/dL  Up to 48 hours............<12.0 mg/dL  3-5 days..................<15.0 mg/dL  6-29 days.................<15.0 mg/dL       Alkaline Phosphatase   Date Value Ref Range  Status   03/02/2023 53 (L) 55 - 135 U/L Final     AST   Date Value Ref Range Status   03/02/2023 20 10 - 40 U/L Final     ALT   Date Value Ref Range Status   03/02/2023 26 10 - 44 U/L Final     Anion Gap   Date Value Ref Range Status   03/02/2023 9 8 - 16 mmol/L Final     eGFR if    Date Value Ref Range Status   03/04/2022 115 > OR = 60 mL/min/1.73m2 Final     eGFR if non    Date Value Ref Range Status   03/04/2022 99 > OR = 60 mL/min/1.73m2 Final     No results found for: CEA  No results found for: PSA        Assessment/Plan:     Problem List Items Addressed This Visit       Malignant gastrointestinal stromal tumor (GIST) of small intestine/pancreas     Patient is doing well and scans show no findings of cancer at this time.  Discussed this today.  Will continue with yearly visits and imaging.           Relevant Orders    CBC Auto Differential    Comprehensive Metabolic Panel    CT Abdomen Pelvis With Contrast     Discussion:     Follow up in about 1 year (around 3/16/2024).      Electronically signed by John Díaz

## 2023-03-16 NOTE — ASSESSMENT & PLAN NOTE
Patient is doing well and scans show no findings of cancer at this time.  Discussed this today.  Will continue with yearly visits and imaging.

## 2023-06-05 DIAGNOSIS — M81.0 SENILE OSTEOPOROSIS: Primary | ICD-10-CM

## 2023-06-08 ENCOUNTER — TELEPHONE (OUTPATIENT)
Dept: PHARMACY | Facility: CLINIC | Age: 68
End: 2023-06-08
Payer: MEDICARE

## 2023-06-08 NOTE — TELEPHONE ENCOUNTER
Lito, billy is Julian, clinical pharmacist with [facility] that is part of your care team.  We have begun working on your prescription that your doctor has sent us. Our next steps include:     Working with your insurance company to obtain approval for your medication  Working with you to ensure your medication is affordable     We will be calling you along the way with updates on your medication but if you have any concerns or receive information that you would like to discuss please reach us at [phone].    Welcome call outcome: Left voicemail    Cosentyx PA approved with a high co pay. Will offer PAP to the patient.

## 2023-06-09 ENCOUNTER — HOSPITAL ENCOUNTER (OUTPATIENT)
Dept: RADIOLOGY | Facility: HOSPITAL | Age: 68
Discharge: HOME | End: 2023-06-09
Attending: INTERNAL MEDICINE
Payer: MEDICARE

## 2023-06-09 ENCOUNTER — INFUSION (OUTPATIENT)
Dept: INFUSION THERAPY | Facility: HOSPITAL | Age: 68
End: 2023-06-09
Attending: INTERNAL MEDICINE
Payer: MEDICARE

## 2023-06-09 VITALS
RESPIRATION RATE: 18 BRPM | SYSTOLIC BLOOD PRESSURE: 119 MMHG | OXYGEN SATURATION: 97 % | HEIGHT: 65 IN | TEMPERATURE: 98 F | WEIGHT: 168 LBS | HEART RATE: 73 BPM | BODY MASS INDEX: 27.99 KG/M2 | DIASTOLIC BLOOD PRESSURE: 80 MMHG

## 2023-06-09 DIAGNOSIS — M81.0 SENILE OSTEOPOROSIS: Primary | ICD-10-CM

## 2023-06-09 DIAGNOSIS — M81.0 SENILE OSTEOPOROSIS: ICD-10-CM

## 2023-06-09 PROCEDURE — 77080 DXA BONE DENSITY AXIAL: CPT | Mod: TC,PO

## 2023-06-09 PROCEDURE — 96372 THER/PROPH/DIAG INJ SC/IM: CPT

## 2023-06-09 PROCEDURE — 63600175 PHARM REV CODE 636 W HCPCS: Mod: JZ,JG | Performed by: INTERNAL MEDICINE

## 2023-06-09 RX ADMIN — DENOSUMAB 60 MG: 60 INJECTION SUBCUTANEOUS at 04:06

## 2023-06-09 NOTE — PLAN OF CARE
Problem: Fall Injury Risk  Goal: Absence of Fall and Fall-Related Injury  Outcome: Ongoing, Progressing  Intervention: Identify and Manage Contributors  Flowsheets (Taken 6/9/2023 1609)  Self-Care Promotion: safe use of adaptive equipment encouraged  Medication Review/Management: medications reviewed  Intervention: Promote Injury-Free Environment  Flowsheets (Taken 6/9/2023 1609)  Safety Promotion/Fall Prevention: assistive device/personal item within reach

## 2023-06-26 ENCOUNTER — TELEPHONE (OUTPATIENT)
Dept: PHARMACY | Facility: CLINIC | Age: 68
End: 2023-06-26
Payer: MEDICARE

## 2023-06-26 NOTE — TELEPHONE ENCOUNTER
Financial Assistance for Cosentyx is approved from 06/26/2023 until 12/31/2023.    Source: [employer]  Phone: [phone]    Dispensing Pharmacy: [facility]    Patient must contact the patient assistance program to schedule their first shipment of medication. They must also call 7-10 business days before each refill is needed to ensure that they can receive their medication before running out.     Patient notified of their approval by voicemail.

## 2023-09-25 DIAGNOSIS — R10.9 UNSPECIFIED ABDOMINAL PAIN: Primary | ICD-10-CM

## 2023-09-25 DIAGNOSIS — R51.9 HEADACHE, UNSPECIFIED: ICD-10-CM

## 2023-10-02 ENCOUNTER — HOSPITAL ENCOUNTER (OUTPATIENT)
Dept: RADIOLOGY | Facility: HOSPITAL | Age: 68
Discharge: HOME | End: 2023-10-02
Attending: FAMILY MEDICINE
Payer: MEDICARE

## 2023-10-02 DIAGNOSIS — R51.9 HEADACHE, UNSPECIFIED: ICD-10-CM

## 2023-10-02 DIAGNOSIS — R10.9 UNSPECIFIED ABDOMINAL PAIN: ICD-10-CM

## 2023-10-02 PROCEDURE — 70553 MRI BRAIN STEM W/O & W/DYE: CPT | Mod: TC

## 2023-10-02 PROCEDURE — 25500020 PHARM REV CODE 255: Performed by: FAMILY MEDICINE

## 2023-10-02 PROCEDURE — 74178 CT ABD&PLV WO CNTR FLWD CNTR: CPT | Mod: TC

## 2023-10-02 PROCEDURE — A9585 GADOBUTROL INJECTION: HCPCS | Performed by: FAMILY MEDICINE

## 2023-10-02 RX ORDER — GADOBUTROL 604.72 MG/ML
7.5 INJECTION INTRAVENOUS
Status: COMPLETED | OUTPATIENT
Start: 2023-10-02 | End: 2023-10-02

## 2023-10-02 RX ADMIN — GADOBUTROL 7.5 ML: 604.72 INJECTION INTRAVENOUS at 09:10

## 2023-10-02 RX ADMIN — IOHEXOL 100 ML: 350 INJECTION, SOLUTION INTRAVENOUS at 09:10

## 2023-12-28 ENCOUNTER — INFUSION (OUTPATIENT)
Dept: INFUSION THERAPY | Facility: HOSPITAL | Age: 68
End: 2023-12-28
Attending: INTERNAL MEDICINE
Payer: MEDICARE

## 2023-12-28 VITALS
HEIGHT: 65 IN | WEIGHT: 155.88 LBS | HEART RATE: 73 BPM | DIASTOLIC BLOOD PRESSURE: 81 MMHG | OXYGEN SATURATION: 99 % | RESPIRATION RATE: 18 BRPM | TEMPERATURE: 98 F | BODY MASS INDEX: 25.97 KG/M2 | SYSTOLIC BLOOD PRESSURE: 137 MMHG

## 2023-12-28 DIAGNOSIS — M81.0 SENILE OSTEOPOROSIS: Primary | ICD-10-CM

## 2023-12-28 PROCEDURE — 63600175 PHARM REV CODE 636 W HCPCS: Mod: JZ,JG | Performed by: INTERNAL MEDICINE

## 2023-12-28 PROCEDURE — 96372 THER/PROPH/DIAG INJ SC/IM: CPT

## 2023-12-28 RX ADMIN — DENOSUMAB 60 MG: 60 INJECTION SUBCUTANEOUS at 03:12

## 2023-12-28 NOTE — PLAN OF CARE
Problem: Fatigue  Goal: Improved Activity Tolerance  Intervention: Promote Improved Energy  Flowsheets (Taken 12/28/2023 3404)  Fatigue Management: fatigue-related activity identified  Activity Management: Ambulated -L4

## 2024-03-01 ENCOUNTER — LAB VISIT (OUTPATIENT)
Dept: LAB | Facility: HOSPITAL | Age: 69
End: 2024-03-01
Attending: INTERNAL MEDICINE
Payer: MEDICARE

## 2024-03-01 DIAGNOSIS — C49.A3 MALIGNANT GASTROINTESTINAL STROMAL TUMOR (GIST) OF SMALL INTESTINE: ICD-10-CM

## 2024-03-01 LAB
ALBUMIN SERPL BCP-MCNC: 4.1 G/DL (ref 3.5–5.2)
ALP SERPL-CCNC: 65 U/L (ref 55–135)
ALT SERPL W/O P-5'-P-CCNC: 14 U/L (ref 10–44)
ANION GAP SERPL CALC-SCNC: 5 MMOL/L (ref 8–16)
AST SERPL-CCNC: 13 U/L (ref 10–40)
BASOPHILS # BLD AUTO: 0.06 K/UL (ref 0–0.2)
BASOPHILS NFR BLD: 0.8 % (ref 0–1.9)
BILIRUB SERPL-MCNC: 0.4 MG/DL (ref 0.1–1)
BUN SERPL-MCNC: 16 MG/DL (ref 8–23)
CALCIUM SERPL-MCNC: 9 MG/DL (ref 8.7–10.5)
CHLORIDE SERPL-SCNC: 106 MMOL/L (ref 95–110)
CO2 SERPL-SCNC: 28 MMOL/L (ref 23–29)
CREAT SERPL-MCNC: 0.6 MG/DL (ref 0.5–1.4)
DIFFERENTIAL METHOD BLD: ABNORMAL
EOSINOPHIL # BLD AUTO: 0.2 K/UL (ref 0–0.5)
EOSINOPHIL NFR BLD: 2.4 % (ref 0–8)
ERYTHROCYTE [DISTWIDTH] IN BLOOD BY AUTOMATED COUNT: 13.2 % (ref 11.5–14.5)
EST. GFR  (NO RACE VARIABLE): >60 ML/MIN/1.73 M^2
GLUCOSE SERPL-MCNC: 89 MG/DL (ref 70–110)
HCT VFR BLD AUTO: 40.1 % (ref 37–48.5)
HGB BLD-MCNC: 12.8 G/DL (ref 12–16)
IMM GRANULOCYTES # BLD AUTO: 0.02 K/UL (ref 0–0.04)
IMM GRANULOCYTES NFR BLD AUTO: 0.3 % (ref 0–0.5)
LYMPHOCYTES # BLD AUTO: 0.7 K/UL (ref 1–4.8)
LYMPHOCYTES NFR BLD: 9.7 % (ref 18–48)
MCH RBC QN AUTO: 29.8 PG (ref 27–31)
MCHC RBC AUTO-ENTMCNC: 31.9 G/DL (ref 32–36)
MCV RBC AUTO: 93 FL (ref 82–98)
MONOCYTES # BLD AUTO: 0.3 K/UL (ref 0.3–1)
MONOCYTES NFR BLD: 4.5 % (ref 4–15)
NEUTROPHILS # BLD AUTO: 6.2 K/UL (ref 1.8–7.7)
NEUTROPHILS NFR BLD: 82.3 % (ref 38–73)
NRBC BLD-RTO: 0 /100 WBC
PLATELET # BLD AUTO: 249 K/UL (ref 150–450)
PMV BLD AUTO: 10.6 FL (ref 9.2–12.9)
POTASSIUM SERPL-SCNC: 4.1 MMOL/L (ref 3.5–5.1)
PROT SERPL-MCNC: 6.8 G/DL (ref 6–8.4)
RBC # BLD AUTO: 4.3 M/UL (ref 4–5.4)
SODIUM SERPL-SCNC: 139 MMOL/L (ref 136–145)
WBC # BLD AUTO: 7.49 K/UL (ref 3.9–12.7)

## 2024-03-01 PROCEDURE — 36415 COLL VENOUS BLD VENIPUNCTURE: CPT | Performed by: INTERNAL MEDICINE

## 2024-03-01 PROCEDURE — 85025 COMPLETE CBC W/AUTO DIFF WBC: CPT | Performed by: INTERNAL MEDICINE

## 2024-03-01 PROCEDURE — 80053 COMPREHEN METABOLIC PANEL: CPT | Performed by: INTERNAL MEDICINE

## 2024-03-07 ENCOUNTER — HOSPITAL ENCOUNTER (OUTPATIENT)
Dept: RADIOLOGY | Facility: HOSPITAL | Age: 69
Discharge: HOME | End: 2024-03-07
Attending: INTERNAL MEDICINE
Payer: MEDICARE

## 2024-03-07 DIAGNOSIS — C49.A3 MALIGNANT GASTROINTESTINAL STROMAL TUMOR (GIST) OF SMALL INTESTINE: ICD-10-CM

## 2024-03-07 PROCEDURE — 25500020 PHARM REV CODE 255: Performed by: INTERNAL MEDICINE

## 2024-03-07 PROCEDURE — 74177 CT ABD & PELVIS W/CONTRAST: CPT | Mod: TC

## 2024-03-07 RX ADMIN — IOHEXOL 100 ML: 350 INJECTION, SOLUTION INTRAVENOUS at 12:03

## 2024-03-14 ENCOUNTER — OFFICE VISIT (OUTPATIENT)
Dept: HEMATOLOGY/ONCOLOGY | Facility: CLINIC | Age: 69
End: 2024-03-14
Payer: MEDICARE

## 2024-03-14 VITALS — WEIGHT: 149.63 LBS | RESPIRATION RATE: 15 BRPM | BODY MASS INDEX: 24.89 KG/M2 | TEMPERATURE: 97 F

## 2024-03-14 DIAGNOSIS — C49.A3 MALIGNANT GASTROINTESTINAL STROMAL TUMOR (GIST) OF SMALL INTESTINE: Primary | ICD-10-CM

## 2024-03-14 PROCEDURE — 99213 OFFICE O/P EST LOW 20 MIN: CPT | Performed by: INTERNAL MEDICINE

## 2024-03-14 PROCEDURE — 99213 OFFICE O/P EST LOW 20 MIN: CPT | Mod: S$PBB,,, | Performed by: INTERNAL MEDICINE

## 2024-03-14 NOTE — ASSESSMENT & PLAN NOTE
Patient is doing well and she appears MICHAEL at this time.  She continues to have some abdominal issues and continues to see Gi as needed.  I discussed continue lab monitoring and visits every six months and can likely move imaging to yearly.

## 2024-03-14 NOTE — PROGRESS NOTES
PROGRESS NOTE    Subjective:       Patient ID: Dai Cummings is a 68 y.o. female.    Chief Complaint:  No chief complaint on file.    follow up for GIST    History of Present Illness:   Dai Cummings is a 68 y.o. female who presents for routine follow up of GIST tumor.     Ms. Cummings doing ok today.  No new complaints.  Has continued chronic abdominal pain.     3/2/2023 CT abd/p with contrast:  1.  No acute intra-abdominal abnormality observed.  2.  Moderate size hiatal hernia with gastric postsurgical changes.  3.  Additional incidental observations as described.    CT abd/p impression 3/3/2022:  negative    EUS 12/3/2019:  There was no sign of significant endosonographic abnormality in the        neck / body / tail pancreas. The pancreatic duct was regular in        contour.       Lesion of interest on CT could not be identified on EUS due to        gastric sleeve and also loop of bowel located between gastric lumen        and area of interest. Attempted EUS from transjejunal views also        unsuccessful.      Ct Abd/P Impression 11/1/2019:  1. Stable size and appearance of 3 cm mass along the anterior aspect of the pancreatic head, consistent with known gastrointestinal stromal tumor.  2. No findings of metastatic disease in the abdomen or pelvis.      CT abd/p 11/8/2018 impression:  IMPRESSION:  1. Stable 3 cm enhancing mass with internal calcification adjacent to the  pancreas, compatible with known gastrointestinal stromal tumor.  2. No findings of metastatic disease in the abdomen or the pelvis.  3. Left mid abdominal small bowel intussusception, which is very likely  transient and of doubtful clinical significance.  4. Additional stable observations as above.    3/7/2024-CT Abd/p:  Negative for recurrence.     Family and Social history reviewed and is unchanged from 8/12/2013      ROS:  Review of Systems   Constitutional:  Negative for appetite  change, fever and unexpected weight change.   HENT:  Negative for mouth sores.    Eyes:  Negative for visual disturbance.   Respiratory:  Negative for cough and shortness of breath.    Cardiovascular:  Negative for chest pain and leg swelling.   Gastrointestinal:  Negative for abdominal pain, blood in stool and diarrhea.   Genitourinary:  Negative for frequency and hematuria.   Musculoskeletal:  Positive for back pain.   Skin:  Negative for rash.   Neurological:  Negative for headaches.   Hematological:  Negative for adenopathy.   Psychiatric/Behavioral:  The patient is not nervous/anxious.           Current Outpatient Medications:     biotin 10,000 mcg Cap, Take by mouth., Disp: , Rfl:     calcium carbonate (OS-SHAYAN) 600 mg calcium (1,500 mg) Tab, Take 600 mg by mouth once., Disp: , Rfl:     CREON 36,000-114,000- 180,000 unit CpDR, , Disp: , Rfl:     cycloSPORINE (RESTASIS) 0.05 % ophthalmic emulsion, 1 drop 2 (two) times daily., Disp: , Rfl:     denosumab (PROLIA) 60 mg/mL Syrg, , Disp: , Rfl:     DULoxetine (CYMBALTA) 60 MG capsule, Take 60 mg by mouth., Disp: , Rfl:     fluocinonide (LIDEX) 0.05 % external solution, Apply to scalp qd-bid, Disp: 50 mL, Rfl: 11    fluocinonide (LIDEX) 0.05 % ointment, Apply to psoriasis bid x 3 weeks, take one week off, Disp: 60 g, Rfl: 2    mometasone 0.1% (ELOCON) 0.1 % cream, Aaa qd-bid x 3 wks, tk 1 wk off then repeat for psoriasis, Disp: 45 g, Rfl: 3    multivit-min/ferrous fumarate (MULTI VITAMIN ORAL), Multi Vitamin, Disp: , Rfl:     multivitamin (THERAGRAN) per tablet, Take 1 tablet by mouth., Disp: , Rfl:     polyethylene glycol 3350 (MIRALAX ORAL), Take by mouth., Disp: , Rfl:     secukinumab (COSENTYX PEN, 2 PENS,) 150 mg/mL PnIj, Inject 300mg SQ qweek x 5 weeks then inject 300mg SQ q 4 weeks, Disp: 20 mL, Rfl: 0    semaglutide (OZEMPIC) 1 mg/dose (2 mg/1.5 mL) PnIj, , Disp: , Rfl:     vitamin D (VITAMIN D3) 1000 units Tab, , Disp: , Rfl:         Objective:        Physical Examination:     Temp 97.3 °F (36.3 °C)   Resp 15   Wt 67.9 kg (149 lb 9.6 oz)   BMI 24.89 kg/m²     GEN: no apparent distress, comfortable; AAOx3  HEAD: atraumatic and normocephalic  EYES: no pallor, no icterus, PERRLA  ENT: external ears WNL; no nasal discharge  NECK: no visible masses, trachea midline  CHEST: Normal respiratory effort  ABDOM: Visibly Flat  SKIN: no visible rashes  NEURO: grossly intact; motor/sensory WNL; AAOx3; no tremors  PSYCH: normal mood, affect and behavior        Labs:   Recent Results (from the past 336 hour(s))   CBC Auto Differential    Collection Time: 03/01/24  1:58 PM   Result Value Ref Range    WBC 7.49 3.90 - 12.70 K/uL    Hemoglobin 12.8 12.0 - 16.0 g/dL    Hematocrit 40.1 37.0 - 48.5 %    Platelets 249 150 - 450 K/uL       CMP  Sodium   Date Value Ref Range Status   03/01/2024 139 136 - 145 mmol/L Final     Potassium   Date Value Ref Range Status   03/01/2024 4.1 3.5 - 5.1 mmol/L Final     Chloride   Date Value Ref Range Status   03/01/2024 106 95 - 110 mmol/L Final     CO2   Date Value Ref Range Status   03/01/2024 28 23 - 29 mmol/L Final     Glucose   Date Value Ref Range Status   03/01/2024 89 70 - 110 mg/dL Final     BUN   Date Value Ref Range Status   03/01/2024 16 8 - 23 mg/dL Final     Creatinine   Date Value Ref Range Status   03/01/2024 0.6 0.5 - 1.4 mg/dL Final     Calcium   Date Value Ref Range Status   03/01/2024 9.0 8.7 - 10.5 mg/dL Final     Total Protein   Date Value Ref Range Status   03/01/2024 6.8 6.0 - 8.4 g/dL Final     Albumin   Date Value Ref Range Status   03/01/2024 4.1 3.5 - 5.2 g/dL Final     Total Bilirubin   Date Value Ref Range Status   03/01/2024 0.4 0.1 - 1.0 mg/dL Final     Comment:     For infants and newborns, interpretation of results should be based  on gestational age, weight and in agreement with clinical  observations.    Premature Infant recommended reference ranges:  Up to 24 hours.............<8.0 mg/dL  Up to 48  "hours............<12.0 mg/dL  3-5 days..................<15.0 mg/dL  6-29 days.................<15.0 mg/dL       Alkaline Phosphatase   Date Value Ref Range Status   03/01/2024 65 55 - 135 U/L Final     AST   Date Value Ref Range Status   03/01/2024 13 10 - 40 U/L Final     ALT   Date Value Ref Range Status   03/01/2024 14 10 - 44 U/L Final     Anion Gap   Date Value Ref Range Status   03/01/2024 5 (L) 8 - 16 mmol/L Final     eGFR if    Date Value Ref Range Status   03/04/2022 115 > OR = 60 mL/min/1.73m2 Final     eGFR if non    Date Value Ref Range Status   03/04/2022 99 > OR = 60 mL/min/1.73m2 Final     No results found for: "CEA"  No results found for: "PSA"        Assessment/Plan:     Problem List Items Addressed This Visit       Malignant gastrointestinal stromal tumor (GIST) of small intestine/pancreas - Primary     Patient is doing well and she appears MICHAEL at this time.  She continues to have some abdominal issues and continues to see Gi as needed.  I discussed continue lab monitoring and visits every six months and can likely move imaging to yearly.           Relevant Orders    CBC Auto Differential    Comprehensive Metabolic Panel    CBC Auto Differential    Comprehensive Metabolic Panel     Discussion:     Follow up in about 6 months (around 9/14/2024).      Electronically signed by John Díaz                "

## 2024-08-26 DIAGNOSIS — M81.0 SENILE OSTEOPOROSIS: Primary | ICD-10-CM

## 2024-08-30 ENCOUNTER — INFUSION (OUTPATIENT)
Dept: INFUSION THERAPY | Facility: HOSPITAL | Age: 69
End: 2024-08-30
Attending: INTERNAL MEDICINE
Payer: MEDICARE

## 2024-08-30 VITALS
TEMPERATURE: 98 F | WEIGHT: 139 LBS | OXYGEN SATURATION: 100 % | HEART RATE: 69 BPM | BODY MASS INDEX: 23.16 KG/M2 | RESPIRATION RATE: 18 BRPM | HEIGHT: 65 IN | SYSTOLIC BLOOD PRESSURE: 115 MMHG | DIASTOLIC BLOOD PRESSURE: 71 MMHG

## 2024-08-30 DIAGNOSIS — M81.0 SENILE OSTEOPOROSIS: Primary | ICD-10-CM

## 2024-08-30 PROCEDURE — 63600175 PHARM REV CODE 636 W HCPCS: Mod: JZ,JG | Performed by: INTERNAL MEDICINE

## 2024-08-30 PROCEDURE — 96372 THER/PROPH/DIAG INJ SC/IM: CPT

## 2024-08-30 RX ADMIN — DENOSUMAB 60 MG: 60 INJECTION SUBCUTANEOUS at 02:08

## 2024-08-30 NOTE — PLAN OF CARE
Problem: Fall Injury Risk  Goal: Absence of Fall and Fall-Related Injury  Outcome: Progressing  Intervention: Identify and Manage Contributors  Flowsheets (Taken 8/30/2024 1434)  Self-Care Promotion: independence encouraged  Medication Review/Management: medications reviewed  Intervention: Promote Injury-Free Environment  Flowsheets (Taken 8/30/2024 1434)  Safety Promotion/Fall Prevention: medications reviewed

## 2024-09-17 ENCOUNTER — OFFICE VISIT (OUTPATIENT)
Facility: CLINIC | Age: 69
End: 2024-09-17
Payer: MEDICARE

## 2024-09-17 VITALS
WEIGHT: 141 LBS | DIASTOLIC BLOOD PRESSURE: 71 MMHG | RESPIRATION RATE: 17 BRPM | TEMPERATURE: 99 F | SYSTOLIC BLOOD PRESSURE: 127 MMHG | HEART RATE: 72 BPM | BODY MASS INDEX: 23.83 KG/M2

## 2024-09-17 DIAGNOSIS — C49.A3 MALIGNANT GASTROINTESTINAL STROMAL TUMOR (GIST) OF SMALL INTESTINE: Primary | ICD-10-CM

## 2024-09-17 PROBLEM — L40.9 PSORIASIS: Status: ACTIVE | Noted: 2024-09-17

## 2024-09-17 PROCEDURE — 99213 OFFICE O/P EST LOW 20 MIN: CPT | Mod: S$PBB,,, | Performed by: INTERNAL MEDICINE

## 2024-09-17 PROCEDURE — 99214 OFFICE O/P EST MOD 30 MIN: CPT | Mod: PBBFAC,PN | Performed by: INTERNAL MEDICINE

## 2024-09-17 PROCEDURE — 99999 PR PBB SHADOW E&M-EST. PATIENT-LVL IV: CPT | Mod: PBBFAC,,, | Performed by: INTERNAL MEDICINE

## 2024-09-17 RX ORDER — DICYCLOMINE HYDROCHLORIDE 20 MG/1
20 TABLET ORAL 2 TIMES DAILY
COMMUNITY

## 2024-09-17 NOTE — ASSESSMENT & PLAN NOTE
Ms. Cummings is doing ok currently.  She continues to follow with GI for abdominal pain issues and is on yearly scanning.  Will arrange CT for March 2025.  Will see her again after that his complete.  Discussed this today.

## 2024-09-17 NOTE — PROGRESS NOTES
PROGRESS NOTE    Subjective:       Patient ID: Dai Cummings is a 68 y.o. female.    Chief Complaint:  No chief complaint on file.    follow up for GIST    History of Present Illness:   Dai Cummings is a 68 y.o. female who presents for routine follow up of GIST tumor.     Ms. Cummings doing ok today.  She continues to follow with GI for abdominal pain.      3/2/2023 CT abd/p with contrast:  1.  No acute intra-abdominal abnormality observed.  2.  Moderate size hiatal hernia with gastric postsurgical changes.  3.  Additional incidental observations as described.    CT abd/p impression 3/3/2022:  negative    EUS 12/3/2019:  There was no sign of significant endosonographic abnormality in the        neck / body / tail pancreas. The pancreatic duct was regular in        contour.       Lesion of interest on CT could not be identified on EUS due to        gastric sleeve and also loop of bowel located between gastric lumen        and area of interest. Attempted EUS from transjejunal views also        unsuccessful.      Ct Abd/P Impression 11/1/2019:  1. Stable size and appearance of 3 cm mass along the anterior aspect of the pancreatic head, consistent with known gastrointestinal stromal tumor.  2. No findings of metastatic disease in the abdomen or pelvis.      CT abd/p 11/8/2018 impression:  IMPRESSION:  1. Stable 3 cm enhancing mass with internal calcification adjacent to the  pancreas, compatible with known gastrointestinal stromal tumor.  2. No findings of metastatic disease in the abdomen or the pelvis.  3. Left mid abdominal small bowel intussusception, which is very likely  transient and of doubtful clinical significance.  4. Additional stable observations as above.    3/7/2024-CT Abd/p:  Negative for recurrence.     Family and Social history reviewed and is unchanged from 8/12/2013      ROS:  Review of Systems   Constitutional:  Negative for appetite change,  fever and unexpected weight change.   HENT:  Negative for mouth sores.    Eyes:  Negative for visual disturbance.   Respiratory:  Negative for cough and shortness of breath.    Cardiovascular:  Negative for chest pain and leg swelling.   Gastrointestinal:  Negative for abdominal pain, blood in stool and diarrhea.   Genitourinary:  Negative for frequency and hematuria.   Musculoskeletal:  Positive for back pain.   Skin:  Negative for rash.   Neurological:  Negative for headaches.   Hematological:  Negative for adenopathy.   Psychiatric/Behavioral:  The patient is not nervous/anxious.           Current Outpatient Medications:     betamethasone valerate 0.1% (VALISONE) 0.1 % Crea, Aaa bid, Disp: 45 g, Rfl: 3    biotin 10,000 mcg Cap, Take by mouth., Disp: , Rfl:     calcium carbonate (OS-SHAYAN) 600 mg calcium (1,500 mg) Tab, Take 600 mg by mouth once., Disp: , Rfl:     CREON 36,000-114,000- 180,000 unit CpDR, , Disp: , Rfl:     cycloSPORINE (RESTASIS) 0.05 % ophthalmic emulsion, 1 drop 2 (two) times daily., Disp: , Rfl:     denosumab (PROLIA) 60 mg/mL Syrg, , Disp: , Rfl:     dicyclomine (BENTYL) 20 mg tablet, Take 20 mg by mouth 2 (two) times daily., Disp: , Rfl:     DULoxetine (CYMBALTA) 60 MG capsule, Take 60 mg by mouth., Disp: , Rfl:     fluocinonide (LIDEX) 0.05 % ointment, Apply to psoriasis bid x 3 weeks, take one week off, Disp: 60 g, Rfl: 2    mometasone 0.1% (ELOCON) 0.1 % cream, Aaa qd-bid x 3 wks, tk 1 wk off then repeat for psoriasis, Disp: 45 g, Rfl: 3    multivit-min/ferrous fumarate (MULTI VITAMIN ORAL), Multi Vitamin, Disp: , Rfl:     multivitamin (THERAGRAN) per tablet, Take 1 tablet by mouth., Disp: , Rfl:     polyethylene glycol 3350 (MIRALAX ORAL), Take by mouth., Disp: , Rfl:     risankizumab-rzaa 150 mg/mL PnIj, Starting week 16 inject 1 pen SC every 12 wks, Disp: 1 mL, Rfl: 2    risankizumab-rzaa 150 mg/mL PnIj, Inject 1 pen SC on weeks 0 and 4, Disp: 2 mL, Rfl: 0    tapinarof (VTAMA) 1 % Crea,  AAA thin layer qday, Disp: 60 g, Rfl: 3    vitamin D (VITAMIN D3) 1000 units Tab, , Disp: , Rfl:         Objective:       Physical Examination:     /71   Pulse 72   Temp 98.5 °F (36.9 °C)   Resp 17   Wt 64 kg (141 lb)   BMI 23.83 kg/m²     GEN: no apparent distress, comfortable; AAOx3  HEAD: atraumatic and normocephalic  EYES: no pallor, no icterus, PERRLA  ENT: external ears WNL; no nasal discharge  NECK: no visible masses, trachea midline  CHEST: Normal respiratory effort  ABDOM: Visibly Flat  SKIN: no visible rashes  NEURO: grossly intact; motor/sensory WNL; AAOx3; no tremors  PSYCH: normal mood, affect and behavior        Labs:   No results found for this or any previous visit (from the past 336 hour(s)).      CMP  Sodium   Date Value Ref Range Status   03/01/2024 139 136 - 145 mmol/L Final     Potassium   Date Value Ref Range Status   03/01/2024 4.1 3.5 - 5.1 mmol/L Final     Chloride   Date Value Ref Range Status   03/01/2024 106 95 - 110 mmol/L Final     CO2   Date Value Ref Range Status   03/01/2024 28 23 - 29 mmol/L Final     Glucose   Date Value Ref Range Status   03/01/2024 89 70 - 110 mg/dL Final     BUN   Date Value Ref Range Status   03/01/2024 16 8 - 23 mg/dL Final     Creatinine   Date Value Ref Range Status   03/01/2024 0.6 0.5 - 1.4 mg/dL Final     Calcium   Date Value Ref Range Status   03/01/2024 9.0 8.7 - 10.5 mg/dL Final     Total Protein   Date Value Ref Range Status   03/01/2024 6.8 6.0 - 8.4 g/dL Final     Albumin   Date Value Ref Range Status   03/01/2024 4.1 3.5 - 5.2 g/dL Final     Total Bilirubin   Date Value Ref Range Status   03/01/2024 0.4 0.1 - 1.0 mg/dL Final     Comment:     For infants and newborns, interpretation of results should be based  on gestational age, weight and in agreement with clinical  observations.    Premature Infant recommended reference ranges:  Up to 24 hours.............<8.0 mg/dL  Up to 48 hours............<12.0 mg/dL  3-5  "days..................<15.0 mg/dL  6-29 days.................<15.0 mg/dL       Alkaline Phosphatase   Date Value Ref Range Status   03/01/2024 65 55 - 135 U/L Final     AST   Date Value Ref Range Status   03/01/2024 13 10 - 40 U/L Final     ALT   Date Value Ref Range Status   03/01/2024 14 10 - 44 U/L Final     Anion Gap   Date Value Ref Range Status   03/01/2024 5 (L) 8 - 16 mmol/L Final     eGFR if    Date Value Ref Range Status   03/04/2022 115 > OR = 60 mL/min/1.73m2 Final     eGFR if non    Date Value Ref Range Status   03/04/2022 99 > OR = 60 mL/min/1.73m2 Final     No results found for: "CEA"  No results found for: "PSA"        Assessment/Plan:     Problem List Items Addressed This Visit       Malignant gastrointestinal stromal tumor (GIST) of small intestine/pancreas - Primary     Ms. Cummings is doing ok currently.  She continues to follow with GI for abdominal pain issues and is on yearly scanning.  Will arrange CT for March 2025.  Will see her again after that his complete.  Discussed this today.          Relevant Orders    CBC Auto Differential    Comprehensive Metabolic Panel    CT Abdomen Pelvis With IV Contrast Routine Oral Contrast       Discussion:     Follow up in about 6 months (around 3/17/2025).      Electronically signed by John Díaz                "

## 2025-03-10 ENCOUNTER — TELEPHONE (OUTPATIENT)
Dept: INFUSION THERAPY | Facility: HOSPITAL | Age: 70
End: 2025-03-10

## 2025-03-24 ENCOUNTER — OFFICE VISIT (OUTPATIENT)
Dept: ORTHOPEDICS | Facility: CLINIC | Age: 70
End: 2025-03-24
Payer: MEDICARE

## 2025-03-24 ENCOUNTER — HOSPITAL ENCOUNTER (OUTPATIENT)
Dept: RADIOLOGY | Facility: HOSPITAL | Age: 70
Discharge: HOME | End: 2025-03-24
Attending: ORTHOPAEDIC SURGERY
Payer: MEDICARE

## 2025-03-24 VITALS
SYSTOLIC BLOOD PRESSURE: 120 MMHG | WEIGHT: 150 LBS | BODY MASS INDEX: 24.99 KG/M2 | HEIGHT: 65 IN | DIASTOLIC BLOOD PRESSURE: 82 MMHG

## 2025-03-24 DIAGNOSIS — M51.360 DEGENERATION OF INTERVERTEBRAL DISC OF LUMBAR REGION WITH DISCOGENIC BACK PAIN: ICD-10-CM

## 2025-03-24 DIAGNOSIS — Z98.890 HISTORY OF KYPHOPLASTY: ICD-10-CM

## 2025-03-24 DIAGNOSIS — M47.816 FACET ARTHRITIS OF LUMBAR REGION: Primary | ICD-10-CM

## 2025-03-24 PROCEDURE — 99203 OFFICE O/P NEW LOW 30 MIN: CPT | Mod: S$PBB,,, | Performed by: ORTHOPAEDIC SURGERY

## 2025-03-24 PROCEDURE — 99999 PR PBB SHADOW E&M-EST. PATIENT-LVL V: CPT | Mod: PBBFAC,,, | Performed by: ORTHOPAEDIC SURGERY

## 2025-03-24 PROCEDURE — 72100 X-RAY EXAM L-S SPINE 2/3 VWS: CPT | Mod: 26,,, | Performed by: RADIOLOGY

## 2025-03-24 PROCEDURE — 99215 OFFICE O/P EST HI 40 MIN: CPT | Mod: PBBFAC,25,PN | Performed by: ORTHOPAEDIC SURGERY

## 2025-03-24 PROCEDURE — 72100 X-RAY EXAM L-S SPINE 2/3 VWS: CPT | Mod: TC,PN

## 2025-03-24 PROCEDURE — 72170 X-RAY EXAM OF PELVIS: CPT | Mod: 26,,, | Performed by: RADIOLOGY

## 2025-03-24 PROCEDURE — 72170 X-RAY EXAM OF PELVIS: CPT | Mod: TC,PN

## 2025-03-24 RX ORDER — CALCIUM CITRATE/VITAMIN D3 500MG-12.5
TABLET,CHEWABLE ORAL
COMMUNITY

## 2025-03-24 RX ORDER — LORATADINE 10 MG/1
10 TABLET ORAL DAILY
COMMUNITY

## 2025-03-24 RX ORDER — DIPHENHYDRAMINE HCL 25 MG
25 TABLET ORAL NIGHTLY PRN
COMMUNITY

## 2025-03-24 RX ORDER — PREGABALIN 50 MG/1
50 CAPSULE ORAL 2 TIMES DAILY
COMMUNITY

## 2025-03-24 RX ORDER — MELATONIN 10 MG
CAPSULE ORAL
COMMUNITY

## 2025-03-24 RX ORDER — CYCLOSPORINE OPHTHALMIC SOLUTION 1 MG/ML
SOLUTION/ DROPS OPHTHALMIC
COMMUNITY

## 2025-03-24 RX ORDER — SUCRALFATE 1 G/1
1 TABLET ORAL 4 TIMES DAILY
COMMUNITY

## 2025-03-24 NOTE — PROGRESS NOTES
Subjective:       Patient ID: Dai Cummings is a 69 y.o. female.    Chief Complaint: Pain of the Lumbar Spine (Lumbar pain x 2 months, went to [place] ED 3/10/25, did XR, we can see report of lumbar but not films, no leg symptoms, limited standing, pain wakes from sleep, )      History of Present Illness    Prior to meeting with the patient I reviewed the medical chart in [facility]. This included reviewing the previous progress notes from our office, review of the patient's last appointment with their primary care provider, review of any visits to the emergency room, and review of any pain management appointments or procedures.   ***    Current Medications  Current Medications[1]    Allergies  Review of patient's allergies indicates:   Allergen Reactions    Ondansetron hcl Nausea Only    Codeine     Demerol [meperidine] Nausea And Vomiting    Ondansetron Nausea Only       Past Medical History  Past Medical History:   Diagnosis Date    Allergy     Bleeding duodenal ulcer 2007    Cancer     Endometriosis     H/O malignant gastrointestinal stromal tumor (GIST)     removed head of pancreas and duodenum       Surgical History  Past Surgical History:   Procedure Laterality Date    APPENDECTOMY      CATARACT EXTRACTION      both    CHOLECYSTECTOMY      DILATION AND CURETTAGE OF UTERUS      ENDOSCOPIC ULTRASOUND OF UPPER GASTROINTESTINAL TRACT N/A 12/03/2019    Procedure: ULTRASOUND, UPPER GI TRACT, ENDOSCOPIC;  Surgeon: Miguel Ángel Arroyo III, MD;  Location: [facility];  Service: Endoscopy;  Laterality: N/A;    GASTRIC BYPASS      HERNIA REPAIR      HYSTERECTOMY      KYPHOPLASTY, SPINE, LUMBAR  12/05/2019    L1    LAPAROSCOPIC GASTRIC BANDING      TONSILLECTOMY      WHIPPLE PROCEDURE         Family History:   Family History   Problem Relation Name Age of Onset    Stroke Mother      Cancer Father      Cancer Sister         Social History:   Social History[2]    Hospitalization/Major Diagnostic Procedure:     Review of Systems      General/Constitutional:  Chills denies. Fatigue denies. Fever denies. Weight gain denies. Weight loss denies.    Respiratory:  Shortness of breath denies.    Cardiovascular:  Chest pain denies.    Gastrointestinal:  Constipation denies. Diarrhea denies. Nausea denies. Vomiting denies.     Hematology:  Easy bruising denies. Prolonged bleeding denies.     Genitourinary:  Frequent urination denies. Pain in lower back denies. Painful urination denies.     Musculoskeletal:  See HPI for details    Skin:  Rash denies.    Neurologic:  Dizziness denies. Gait abnormalities denies. Seizures denies. Tingling/Numbess denies.    Psychiatric:  Anxiety denies. Depressed mood denies.     Objective:   Vital Signs:   Vitals:    03/24/25 1309   BP: 120/82        Physical Exam      General Examination:     Constitutional: The patient is alert and oriented to lace person and time. Mood is pleasant.     Head/Face: Normal facial features normal eyebrows    Eyes: Normal extraocular motion bilaterally    Lungs: Respirations are equal and unlabored    Gait is coordinated.    Cardiovascular: There are no swelling or varicosities present.    Lymphatic: Negative for adenopathy    Skin: Normal    Neurological: Level of consciousness normal. Oriented to place person and time and situation    Psychiatric: Oriented to time place person and situation    ***  XRAY Report/ Interpretation:***      Assessment:       1. Pain        Plan:       Dai was seen today for pain.    Diagnoses and all orders for this visit:    Pain  -     X-Ray Lumbar Spine Ap And Lateral  -     X-Ray Pelvis Routine AP         No follow-ups on file.    ***  Treatment options were discussed with regards to the nature of the medical condition. Conservative pain intervention and surgical options were discussed in detail. The probability of success of each separate treatment option was discussed. The patient expressed a clear understanding of the treatment options. With  regards to surgery, the procedure risk, benefits, complications, and outcomes were discussed. No guarantees were given with regards to surgical outcome.   The risk of complications, morbidity, and mortality of patient management decisions have been made at the time of this visit. These are associated with the patient's problems, diagnostic procedures and treatment options. This includes the possible management options selected and those considered but not selected by the patient after shared medical decision making we discussed with the patient.     This note was created using Dragon voice recognition software that occasionally misinterpreted phrases or words.         [1]   Current Outpatient Medications   Medication Sig Dispense Refill    calcium citrate-vitamin D3 500 mg-12.5 mcg (500 unit) Chew Take by mouth.      cycloSPORINE (VEVYE) 0.1 % Drop Apply to eye.      denosumab (PROLIA) 60 mg/mL Syrg       dicyclomine (BENTYL) 20 mg tablet Take 20 mg by mouth 2 (two) times daily.      diphenhydrAMINE (SOMINEX) 25 mg tablet Take 25 mg by mouth nightly as needed for Insomnia.      fluocinonide (LIDEX) 0.05 % ointment Apply to psoriasis bid x 3 weeks, take one week off 60 g 2    loratadine (CLARITIN) 10 mg tablet Take 10 mg by mouth once daily.      melatonin 10 mg Cap Take by mouth.      mometasone 0.1% (ELOCON) 0.1 % cream Aaa qd-bid x 3 wks, tk 1 wk off then repeat for psoriasis 45 g 3    multivit-min/ferrous fumarate (MULTI VITAMIN ORAL) Multi Vitamin      multivitamin (THERAGRAN) per tablet Take 1 tablet by mouth.      polyethylene glycol 3350 (MIRALAX ORAL) Take by mouth.      pregabalin (LYRICA) 50 MG capsule Take 50 mg by mouth 2 (two) times daily.      sucralfate (CARAFATE) 1 gram tablet Take 1 g by mouth 4 (four) times daily.      vitamin D (VITAMIN D3) 1000 units Tab       betamethasone valerate 0.1% (VALISONE) 0.1 % Crea Aaa bid (Patient not taking: Reported on 3/24/2025) 45 g 3    biotin 10,000 mcg Cap Take  by mouth. (Patient not taking: Reported on 3/24/2025)      calcium carbonate (OS-SHAYAN) 600 mg calcium (1,500 mg) Tab Take 600 mg by mouth once. (Patient not taking: Reported on 3/24/2025)      CREON 36,000-114,000- 180,000 unit CpDR       cycloSPORINE (RESTASIS) 0.05 % ophthalmic emulsion 1 drop 2 (two) times daily.      DULoxetine (CYMBALTA) 60 MG capsule Take 60 mg by mouth.      risankizumab-rzaa 150 mg/mL PnIj Starting week 16 inject 1 pen SC every 12 wks (Patient not taking: Reported on 3/24/2025) 1 mL 2    risankizumab-rzaa 150 mg/mL PnIj Inject 1 pen SC on weeks 0 and 4 (Patient not taking: Reported on 3/24/2025) 2 mL 0    tapinarof (VTAMA) 1 % Crea AAA thin layer qday 60 g 3     No current facility-administered medications for this visit.   [2]   Social History  Socioeconomic History    Marital status:    Tobacco Use    Smoking status: Never    Smokeless tobacco: Never   Substance and Sexual Activity    Alcohol use: No    Drug use: No     Social Drivers of Health     Financial Resource Strain: Low Risk  (9/14/2024)    Overall Financial Resource Strain (CARDIA)     Difficulty of Paying Living Expenses: Not hard at all   Food Insecurity: No Food Insecurity (9/14/2024)    Hunger Vital Sign     Worried About Running Out of Food in the Last Year: Never true     Ran Out of Food in the Last Year: Never true   Physical Activity: Inactive (9/14/2024)    Exercise Vital Sign     Days of Exercise per Week: 0 days     Minutes of Exercise per Session: 0 min   Stress: No Stress Concern Present (9/14/2024)    [employer] [place] [employer] - Occupational Stress Questionnaire     Feeling of Stress : Not at all   Housing Stability: Unknown (9/14/2024)    Housing Stability Vital Sign     Unable to Pay for Housing in the Last Year: No

## 2025-03-24 NOTE — PROGRESS NOTES
Subjective:       Patient ID: Dai Cummings is a 69 y.o. female.    Chief Complaint: Pain of the Lumbar Spine (Lumbar pain x 2 months, went to [facility] ED 3/10/25, did XR, we can see report of lumbar but not films, no leg symptoms, limited standing, pain wakes from sleep, )      History of Present Illness    Prior to meeting with the patient I reviewed the medical chart in [facility]. This included reviewing the previous progress notes from our office, review of the patient's last appointment with their primary care provider, review of any visits to the emergency room, and review of any pain management appointments or procedures.   Two-month history of right-sided low back pain without radiation thinks that it may have started from repetitive lifting of an ill family member went to the ER was given diclofenac and Flexeril she has had a Whipple procedure in the past and she is reluctant to take anti-inflammatory medications or any type of medication long-term    Current Medications  Current Medications[1]    Allergies  Review of patient's allergies indicates:   Allergen Reactions    Ondansetron hcl Nausea Only    Codeine     Demerol [meperidine] Nausea And Vomiting    Ondansetron Nausea Only       Past Medical History  Past Medical History:   Diagnosis Date    Allergy     Bleeding duodenal ulcer 2007    Cancer     Endometriosis     H/O malignant gastrointestinal stromal tumor (GIST)     removed head of pancreas and duodenum       Surgical History  Past Surgical History:   Procedure Laterality Date    APPENDECTOMY      CATARACT EXTRACTION      both    CHOLECYSTECTOMY      DILATION AND CURETTAGE OF UTERUS      ENDOSCOPIC ULTRASOUND OF UPPER GASTROINTESTINAL TRACT N/A 12/03/2019    Procedure: ULTRASOUND, UPPER GI TRACT, ENDOSCOPIC;  Surgeon: Miguel Ángel Arroyo III, MD;  Location: [facility];  Service: Endoscopy;  Laterality: N/A;    GASTRIC BYPASS      HERNIA REPAIR      HYSTERECTOMY      KYPHOPLASTY, SPINE, LUMBAR   12/05/2019    L1    LAPAROSCOPIC GASTRIC BANDING      TONSILLECTOMY      WHIPPLE PROCEDURE         Family History:   Family History   Problem Relation Name Age of Onset    Stroke Mother      Cancer Father      Cancer Sister         Social History:   Social History[2]    Hospitalization/Major Diagnostic Procedure:     Review of Systems     General/Constitutional:  Chills denies. Fatigue denies. Fever denies. Weight gain denies. Weight loss denies.    Respiratory:  Shortness of breath denies.    Cardiovascular:  Chest pain denies.    Gastrointestinal:  Constipation denies. Diarrhea denies. Nausea denies. Vomiting denies.     Hematology:  Easy bruising denies. Prolonged bleeding denies.     Genitourinary:  Frequent urination denies. Pain in lower back denies. Painful urination denies.     Musculoskeletal:  See HPI for details    Skin:  Rash denies.    Neurologic:  Dizziness denies. Gait abnormalities denies. Seizures denies. Tingling/Numbess denies.    Psychiatric:  Anxiety denies. Depressed mood denies.     Objective:   Vital Signs:   Vitals:    03/24/25 1309   BP: 120/82        Physical Exam      General Examination:     Constitutional: The patient is alert and oriented to lace person and time. Mood is pleasant.     Head/Face: Normal facial features normal eyebrows    Eyes: Normal extraocular motion bilaterally    Lungs: Respirations are equal and unlabored    Gait is coordinated.    Cardiovascular: There are no swelling or varicosities present.    Lymphatic: Negative for adenopathy    Skin: Normal    Neurological: Level of consciousness normal. Oriented to place person and time and situation    Psychiatric: Oriented to time place person and situation    Moderate tenderness right posterior iliac spine no spasm noted marked pain with facet joint loading mild spasm on the right range of motion limited straight-leg-raising negative hip range of motion intact no edema adenopathy varicosities    XRAY Report/  Interpretation :  AP pelvis x-ray was taken today. Indications low back pain and/or hip pain. Findings AP pelvis x-ray appears to be normal with no evidence of fractures or significant degenerative disease  AP and lateral x-rays of lumbar spine will performed today. Indications low back pain. Findings:  Minimal left-sided scoliosis some facet joint sclerosis not significant normal disc space maintenance      Assessment:       1. Facet arthritis of lumbar region    2. History of kyphoplasty    3. Degeneration of intervertebral disc of lumbar region with discogenic back pain        Plan:       Dai was seen today for pain.    Diagnoses and all orders for this visit:    Facet arthritis of lumbar region  -     Ambulatory Referral/Consult to Physical Therapy    History of kyphoplasty    Degeneration of intervertebral disc of lumbar region with discogenic back pain  -     X-Ray Lumbar Spine Ap And Lateral  -     X-Ray Pelvis Routine AP         Follow up for 4 week f/u - lumbar pain, PT f/u.    Treatment options discussed patient referred for course of outpatient physical therapy return for 6 weeks if still symptomatic order MRI and possible pain management referral no signs or symptoms of radicular pain  Treatment options were discussed with regards to the nature of the medical condition. Conservative pain intervention and surgical options were discussed in detail. The probability of success of each separate treatment option was discussed. The patient expressed a clear understanding of the treatment options. With regards to surgery, the procedure risk, benefits, complications, and outcomes were discussed. No guarantees were given with regards to surgical outcome.   The risk of complications, morbidity, and mortality of patient management decisions have been made at the time of this visit. These are associated with the patient's problems, diagnostic procedures and treatment options. This includes the possible management  options selected and those considered but not selected by the patient after shared medical decision making we discussed with the patient.     This note was created using Dragon voice recognition software that occasionally misinterpreted phrases or words.           [1]   Current Outpatient Medications   Medication Sig Dispense Refill    calcium citrate-vitamin D3 500 mg-12.5 mcg (500 unit) Chew Take by mouth.      cycloSPORINE (VEVYE) 0.1 % Drop Apply to eye.      denosumab (PROLIA) 60 mg/mL Syrg       dicyclomine (BENTYL) 20 mg tablet Take 20 mg by mouth 2 (two) times daily.      diphenhydrAMINE (SOMINEX) 25 mg tablet Take 25 mg by mouth nightly as needed for Insomnia.      fluocinonide (LIDEX) 0.05 % ointment Apply to psoriasis bid x 3 weeks, take one week off 60 g 2    loratadine (CLARITIN) 10 mg tablet Take 10 mg by mouth once daily.      melatonin 10 mg Cap Take by mouth.      mometasone 0.1% (ELOCON) 0.1 % cream Aaa qd-bid x 3 wks, tk 1 wk off then repeat for psoriasis 45 g 3    multivit-min/ferrous fumarate (MULTI VITAMIN ORAL) Multi Vitamin      multivitamin (THERAGRAN) per tablet Take 1 tablet by mouth.      polyethylene glycol 3350 (MIRALAX ORAL) Take by mouth.      pregabalin (LYRICA) 50 MG capsule Take 50 mg by mouth 2 (two) times daily.      sucralfate (CARAFATE) 1 gram tablet Take 1 g by mouth 4 (four) times daily.      vitamin D (VITAMIN D3) 1000 units Tab       betamethasone valerate 0.1% (VALISONE) 0.1 % Crea Aaa bid (Patient not taking: Reported on 3/24/2025) 45 g 3    biotin 10,000 mcg Cap Take by mouth. (Patient not taking: Reported on 3/24/2025)      calcium carbonate (OS-SHAYAN) 600 mg calcium (1,500 mg) Tab Take 600 mg by mouth once. (Patient not taking: Reported on 3/24/2025)      CREON 36,000-114,000- 180,000 unit CpDR       cycloSPORINE (RESTASIS) 0.05 % ophthalmic emulsion 1 drop 2 (two) times daily.      DULoxetine (CYMBALTA) 60 MG capsule Take 60 mg by mouth.      risankizumab-rzaa 150 mg/mL  PnIj Starting week 16 inject 1 pen SC every 12 wks (Patient not taking: Reported on 3/24/2025) 1 mL 2    risankizumab-rzaa 150 mg/mL PnIj Inject 1 pen SC on weeks 0 and 4 (Patient not taking: Reported on 3/24/2025) 2 mL 0    tapinarof (VTAMA) 1 % Crea AAA thin layer qday 60 g 3     No current facility-administered medications for this visit.   [2]   Social History  Socioeconomic History    Marital status:    Tobacco Use    Smoking status: Never    Smokeless tobacco: Never   Substance and Sexual Activity    Alcohol use: No    Drug use: No     Social Drivers of Health     Financial Resource Strain: Low Risk  (9/14/2024)    Overall Financial Resource Strain (CARDIA)     Difficulty of Paying Living Expenses: Not hard at all   Food Insecurity: No Food Insecurity (9/14/2024)    Hunger Vital Sign     Worried About Running Out of Food in the Last Year: Never true     Ran Out of Food in the Last Year: Never true   Physical Activity: Inactive (9/14/2024)    Exercise Vital Sign     Days of Exercise per Week: 0 days     Minutes of Exercise per Session: 0 min   Stress: No Stress Concern Present (9/14/2024)    [employer] - Occupational Stress Questionnaire     Feeling of Stress : Not at all   Housing Stability: Unknown (9/14/2024)    Housing Stability Vital Sign     Unable to Pay for Housing in the Last Year: No

## 2025-03-27 ENCOUNTER — INFUSION (OUTPATIENT)
Dept: INFUSION THERAPY | Facility: HOSPITAL | Age: 70
End: 2025-03-27
Attending: INTERNAL MEDICINE
Payer: MEDICARE

## 2025-03-27 VITALS
DIASTOLIC BLOOD PRESSURE: 82 MMHG | RESPIRATION RATE: 16 BRPM | SYSTOLIC BLOOD PRESSURE: 133 MMHG | HEART RATE: 77 BPM | BODY MASS INDEX: 24.64 KG/M2 | WEIGHT: 147.88 LBS | TEMPERATURE: 98 F | OXYGEN SATURATION: 99 % | HEIGHT: 65 IN

## 2025-03-27 DIAGNOSIS — M81.0 SENILE OSTEOPOROSIS: Primary | ICD-10-CM

## 2025-03-27 PROCEDURE — 63600175 PHARM REV CODE 636 W HCPCS: Mod: JZ,TB | Performed by: INTERNAL MEDICINE

## 2025-03-27 PROCEDURE — 96372 THER/PROPH/DIAG INJ SC/IM: CPT

## 2025-03-27 RX ADMIN — DENOSUMAB 60 MG: 60 INJECTION SUBCUTANEOUS at 01:03

## 2025-04-30 ENCOUNTER — OFFICE VISIT (OUTPATIENT)
Dept: ORTHOPEDICS | Facility: CLINIC | Age: 70
End: 2025-04-30
Payer: MEDICARE

## 2025-04-30 VITALS
HEIGHT: 65 IN | SYSTOLIC BLOOD PRESSURE: 122 MMHG | WEIGHT: 147.94 LBS | DIASTOLIC BLOOD PRESSURE: 82 MMHG | BODY MASS INDEX: 24.65 KG/M2

## 2025-04-30 DIAGNOSIS — M51.360 DEGENERATION OF INTERVERTEBRAL DISC OF LUMBAR REGION WITH DISCOGENIC BACK PAIN: ICD-10-CM

## 2025-04-30 DIAGNOSIS — M47.816 FACET ARTHRITIS OF LUMBAR REGION: Primary | ICD-10-CM

## 2025-04-30 DIAGNOSIS — Z98.890 HISTORY OF KYPHOPLASTY: ICD-10-CM

## 2025-04-30 PROCEDURE — 99213 OFFICE O/P EST LOW 20 MIN: CPT | Mod: S$PBB,,, | Performed by: PHYSICIAN ASSISTANT

## 2025-04-30 PROCEDURE — 99214 OFFICE O/P EST MOD 30 MIN: CPT | Mod: PBBFAC,PN | Performed by: PHYSICIAN ASSISTANT

## 2025-04-30 PROCEDURE — 99999 PR PBB SHADOW E&M-EST. PATIENT-LVL IV: CPT | Mod: PBBFAC,,, | Performed by: PHYSICIAN ASSISTANT

## 2025-04-30 RX ORDER — CYCLOBENZAPRINE HCL 5 MG
5 TABLET ORAL 3 TIMES DAILY PRN
Qty: 30 TABLET | Refills: 0 | Status: CANCELLED | OUTPATIENT
Start: 2025-04-30 | End: 2025-05-10

## 2025-04-30 RX ORDER — CYCLOBENZAPRINE HCL 10 MG
10 TABLET ORAL 3 TIMES DAILY PRN
Qty: 90 TABLET | Refills: 2 | Status: SHIPPED | OUTPATIENT
Start: 2025-04-30 | End: 2025-07-29

## 2025-04-30 NOTE — PROGRESS NOTES
[facility]  [address]  [place] [address]  Phone: [phone]   [phone]    Patient's PCP: Josie Thompson MD  Referring Provider: No ref. provider found    Subjective:      Chief Complaint:   Chief Complaint   Patient presents with    Lumbar Spine - Pain     4 week lumbar/PT. PT is very painful. Pain with sitting long periods, standing. Muscle relaxer to sleep. Pain increases as they day goes on. Describes pain as a bite/clap/pinch. Has found some relief with PT/dry needling/cupping.        Past Medical History:   Diagnosis Date    Allergy     Bleeding duodenal ulcer 2007    Cancer     Endometriosis     H/O malignant gastrointestinal stromal tumor (GIST)     removed head of pancreas and duodenum       Past Surgical History:   Procedure Laterality Date    APPENDECTOMY      CATARACT EXTRACTION      both    CHOLECYSTECTOMY      DILATION AND CURETTAGE OF UTERUS      ENDOSCOPIC ULTRASOUND OF UPPER GASTROINTESTINAL TRACT N/A 12/03/2019    Procedure: ULTRASOUND, UPPER GI TRACT, ENDOSCOPIC;  Surgeon: Miguel Ángel Arroyo III, MD;  Location: [facility];  Service: Endoscopy;  Laterality: N/A;    GASTRIC BYPASS      HERNIA REPAIR      HYSTERECTOMY      KYPHOPLASTY, SPINE, LUMBAR  12/05/2019    L1    LAPAROSCOPIC GASTRIC BANDING      TONSILLECTOMY      WHIPPLE PROCEDURE         Current Outpatient Medications   Medication Sig    calcium citrate-vitamin D3 500 mg-12.5 mcg (500 unit) Chew Take by mouth.    denosumab (PROLIA) 60 mg/mL Syrg     diphenhydrAMINE (SOMINEX) 25 mg tablet Take 25 mg by mouth nightly as needed for Insomnia.    fluocinonide (LIDEX) 0.05 % ointment Apply to psoriasis bid x 3 weeks, take one week off    loratadine (CLARITIN) 10 mg tablet Take 10 mg by mouth once daily.    melatonin 10 mg Cap Take by mouth.    mometasone 0.1% (ELOCON) 0.1 % cream Aaa qd-bid x 3 wks, tk 1 wk off then repeat for psoriasis    multivitamin (THERAGRAN) per tablet Take 1 tablet by mouth.     polyethylene glycol 3350 (MIRALAX ORAL) Take by mouth.    pregabalin (LYRICA) 50 MG capsule Take 50 mg by mouth 2 (two) times daily.    sucralfate (CARAFATE) 1 gram tablet Take 1 g by mouth 4 (four) times daily.    vitamin D (VITAMIN D3) 1000 units Tab     betamethasone valerate 0.1% (VALISONE) 0.1 % Crea Aaa bid (Patient not taking: Reported on 4/30/2025)    biotin 10,000 mcg Cap Take by mouth. (Patient not taking: Reported on 4/30/2025)    calcium carbonate (OS-SHAYAN) 600 mg calcium (1,500 mg) Tab Take 600 mg by mouth once. (Patient not taking: Reported on 4/30/2025)    CREON 36,000-114,000- 180,000 unit CpDR     cyclobenzaprine (FLEXERIL) 10 MG tablet Take 1 tablet (10 mg total) by mouth 3 (three) times daily as needed for Muscle spasms.    cycloSPORINE (RESTASIS) 0.05 % ophthalmic emulsion 1 drop 2 (two) times daily.    cycloSPORINE (VEVYE) 0.1 % Drop Apply to eye. (Patient not taking: Reported on 4/30/2025)    dicyclomine (BENTYL) 20 mg tablet Take 20 mg by mouth 2 (two) times daily. (Patient not taking: Reported on 4/30/2025)    DULoxetine (CYMBALTA) 60 MG capsule Take 60 mg by mouth.    multivit-min/ferrous fumarate (MULTI VITAMIN ORAL) Multi Vitamin (Patient not taking: Reported on 4/30/2025)    risankizumab-rzaa 150 mg/mL PnIj Starting week 16 inject 1 pen SC every 12 wks (Patient not taking: Reported on 4/30/2025)    risankizumab-rzaa 150 mg/mL PnIj Inject 1 pen SC on weeks 0 and 4 (Patient not taking: Reported on 4/30/2025)    tapinarof (VTAMA) 1 % Crea AAA thin layer qday     No current facility-administered medications for this visit.       Review of patient's allergies indicates:   Allergen Reactions    Ondansetron hcl Nausea Only    Codeine     Demerol [meperidine] Nausea And Vomiting    Ondansetron Nausea Only       Family History   Problem Relation Name Age of Onset    Stroke Mother      Cancer Father      Cancer Sister         Social History[1]    Prior to meeting with the patient I reviewed the  medical chart in Epic. This included reviewing the previous progress notes from our office, review of the patient's last appointment with their primary care provider, review of any visits to the emergency room, and review of any pain management appointments or procedures.    History of present illness: 69 y.o. female,  returns to clinic today to follow up on low back pain.  New patient visit March of this year.  Primarily axial back pain or low back pain without radiculitis.  Sent to physical therapy.  Poor candidate for anti-inflammatories due to history of Whipple procedure.  Minimal relief or improvement with therapy.       Review of Systems:    Constitutional: Negative for chills, fever and weight loss.   HENT: Negative for congestion.    Eyes: Negative for discharge and redness.   Respiratory: Negative for cough and shortness of breath.    Cardiovascular: Negative for chest pain.   Gastrointestinal: Negative for nausea and vomiting.   Musculoskeletal: See HPI.   Skin: Negative for rash.   Neurological: Negative for headaches.   Endo/Heme/Allergies: Does not bruise/bleed easily.   Psychiatric/Behavioral: The patient is not nervous/anxious.    All other systems reviewed and are negative.       Objective:      Physical Examination:    Vital Signs:    Vitals:    04/30/25 0912   BP: 122/82       Body mass index is 25 kg/m².    This a well-developed, well nourished patient in no acute distress.  They are alert and oriented and cooperative to examination.     Generalized lumbar sacral pain or tenderness.  Straight leg raise is negative.  Stands erect, ambulates with a nonantalgic gait.  Non antalgic sit to stand.      Pertinent New Results:        XRAY Report / Interpretation:       Procedure/s:            Assessment:       1. Facet arthritis of lumbar region    2. Degeneration of intervertebral disc of lumbar region with discogenic back pain    3. History of kyphoplasty      Plan:     Facet arthritis of lumbar  region  -     MRI Lumbar Spine Without Contrast; Future; Expected date: 04/30/2025  -     cyclobenzaprine (FLEXERIL) 10 MG tablet; Take 1 tablet (10 mg total) by mouth 3 (three) times daily as needed for Muscle spasms.  Dispense: 90 tablet; Refill: 2    Degeneration of intervertebral disc of lumbar region with discogenic back pain  -     MRI Lumbar Spine Without Contrast; Future; Expected date: 04/30/2025    History of kyphoplasty  -     MRI Lumbar Spine Without Contrast; Future; Expected date: 04/30/2025        Follow up for Mon/Wed with Dr. Castillo, 4 week f/u - lumbar MRI results, PT follow up.    Patient continues with complaints of low back pain without radicular symptoms.  She has only had 3 visits with physical therapy, she has had some dry needling and some cupping with mixed results.  I think she needs to give physical therapy more time to see if it improves her symptoms.  However, I have went ahead and ordered an MRI of her lumbar spine.  We discussed pain management which she states has not helped for her before in the past either and the next step may potentially be lumbar fusion all this was discussed with the patient in great detail.  We are going to continue physical therapy, we are going to get an MRI and we will see her back with those results.  I gave her a prescription for Flexeril, she has been taking a family members prescription which seems to work better than the Robaxin.    The patient and I had a thorough discussion today.  We discussed the working diagnosis as well as several other potential alternative diagnoses.  We discussed treatment options, both conservative and surgical.  Conservative treatment options would include things such as activity modifications, workplace modifications, a period of rest, oral versus topical over the counter and oral versus topical prescription anti-inflammatory medications, physical therapy / occupational therapy, splinting / bracing, immobilization,  corticosteroid injections, and others.        LAZARA Serrato, PA-C        EMR Statement:  Please note that portions of this patient encounter record were imported from the patients electronic medical record and that others were dictated using voice recognition software. For these reasons grammatical errors, nonsensical language, and apparently contradictory statements may be present.  These should be disregarded or interpreted with respect to the context of the document.       [1]   Social History  Socioeconomic History    Marital status:    Tobacco Use    Smoking status: Never    Smokeless tobacco: Never   Substance and Sexual Activity    Alcohol use: No    Drug use: No     Social Drivers of Health     Financial Resource Strain: Low Risk  (9/14/2024)    Overall Financial Resource Strain (CARDIA)     Difficulty of Paying Living Expenses: Not hard at all   Food Insecurity: No Food Insecurity (9/14/2024)    Hunger Vital Sign     Worried About Running Out of Food in the Last Year: Never true     Ran Out of Food in the Last Year: Never true   Physical Activity: Inactive (9/14/2024)    Exercise Vital Sign     Days of Exercise per Week: 0 days     Minutes of Exercise per Session: 0 min   Stress: No Stress Concern Present (9/14/2024)    [employer] - Occupational Stress Questionnaire     Feeling of Stress : Not at all   Housing Stability: Unknown (9/14/2024)    Housing Stability Vital Sign     Unable to Pay for Housing in the Last Year: No

## 2025-05-01 ENCOUNTER — HOSPITAL ENCOUNTER (OUTPATIENT)
Dept: RADIOLOGY | Facility: HOSPITAL | Age: 70
Discharge: HOME | End: 2025-05-01
Attending: PHYSICIAN ASSISTANT
Payer: MEDICARE

## 2025-05-01 DIAGNOSIS — Z98.890 HISTORY OF KYPHOPLASTY: ICD-10-CM

## 2025-05-01 DIAGNOSIS — M51.360 DEGENERATION OF INTERVERTEBRAL DISC OF LUMBAR REGION WITH DISCOGENIC BACK PAIN: ICD-10-CM

## 2025-05-01 DIAGNOSIS — M47.816 FACET ARTHRITIS OF LUMBAR REGION: ICD-10-CM

## 2025-05-01 PROCEDURE — 72148 MRI LUMBAR SPINE W/O DYE: CPT | Mod: TC,PO

## 2025-05-01 PROCEDURE — 72148 MRI LUMBAR SPINE W/O DYE: CPT | Mod: 26,,, | Performed by: RADIOLOGY

## 2025-07-14 ENCOUNTER — OFFICE VISIT (OUTPATIENT)
Dept: ORTHOPEDICS | Facility: CLINIC | Age: 70
End: 2025-07-14
Payer: MEDICARE

## 2025-07-14 VITALS — WEIGHT: 142.81 LBS | BODY MASS INDEX: 23.79 KG/M2 | HEIGHT: 65 IN

## 2025-07-14 DIAGNOSIS — M51.360 DEGENERATION OF INTERVERTEBRAL DISC OF LUMBAR REGION WITH DISCOGENIC BACK PAIN: ICD-10-CM

## 2025-07-14 DIAGNOSIS — M47.816 FACET ARTHRITIS OF LUMBAR REGION: Primary | ICD-10-CM

## 2025-07-14 PROCEDURE — 99213 OFFICE O/P EST LOW 20 MIN: CPT | Mod: S$PBB,,, | Performed by: ORTHOPAEDIC SURGERY

## 2025-07-14 PROCEDURE — 99999 PR PBB SHADOW E&M-EST. PATIENT-LVL IV: CPT | Mod: PBBFAC,,, | Performed by: ORTHOPAEDIC SURGERY

## 2025-07-14 PROCEDURE — 99214 OFFICE O/P EST MOD 30 MIN: CPT | Mod: PBBFAC,PN | Performed by: ORTHOPAEDIC SURGERY

## 2025-07-14 RX ORDER — DULOXETIN HYDROCHLORIDE 30 MG/1
30 CAPSULE, DELAYED RELEASE ORAL
COMMUNITY

## 2025-07-14 NOTE — PROGRESS NOTES
Subjective:       Patient ID: Dai Cummings is a 69 y.o. female.    Chief Complaint: Pain of the Lumbar Spine (MRI Lumbar results, pain is same, no leg symptoms, pain tends to fluctuate, )      History of Present Illness    Prior to meeting with the patient I reviewed the medical chart in [facility]. This included reviewing the previous progress notes from our office, review of the patient's last appointment with their primary care provider, review of any visits to the emergency room, and review of any pain management appointments or procedures.   Ms. Cummings comes in today for follow-up for her lumbar spine.  She has had her MRI.  Comes in today with those results for review.  She continues to complain of primarily low back pain more right-sided versus left.  Denies any real radicular symptoms.  She has tried physical therapy.  It was not particularly efficacious, actually exacerbated her symptoms.  She has tried both Robaxin and Flexeril muscle relaxers with some relief of her symptoms.  We have avoided the use of NSAIDs secondary to her previous history of Whipple procedure.    Current Medications  Current Medications[1]    Allergies  Review of patient's allergies indicates:   Allergen Reactions    Ondansetron hcl Nausea Only    Codeine     Demerol [meperidine] Nausea And Vomiting    Ondansetron Nausea Only       Past Medical History  Past Medical History:   Diagnosis Date    Allergy     Bleeding duodenal ulcer 2007    Cancer     Endometriosis     H/O malignant gastrointestinal stromal tumor (GIST)     removed head of pancreas and duodenum       Surgical History  Past Surgical History:   Procedure Laterality Date    APPENDECTOMY      CATARACT EXTRACTION      both    CHOLECYSTECTOMY      DILATION AND CURETTAGE OF UTERUS      ENDOSCOPIC ULTRASOUND OF UPPER GASTROINTESTINAL TRACT N/A 12/03/2019    Procedure: ULTRASOUND, UPPER GI TRACT, ENDOSCOPIC;  Surgeon: Miguel Ángel Arroyo III, MD;  Location: [facility];  Service:  Endoscopy;  Laterality: N/A;    GASTRIC BYPASS      HERNIA REPAIR      HYSTERECTOMY      KYPHOPLASTY, SPINE, LUMBAR  12/05/2019    L1    LAPAROSCOPIC GASTRIC BANDING      TONSILLECTOMY      WHIPPLE PROCEDURE         Family History:   Family History   Problem Relation Name Age of Onset    Stroke Mother      Cancer Father      Cancer Sister         Social History:   Social History[2]    Hospitalization/Major Diagnostic Procedure:     Review of Systems     General/Constitutional:  Chills denies. Fatigue denies. Fever denies. Weight gain denies. Weight loss denies.    Respiratory:  Shortness of breath denies.    Cardiovascular:  Chest pain denies.    Gastrointestinal:  Constipation denies. Diarrhea denies. Nausea denies. Vomiting denies.     Hematology:  Easy bruising denies. Prolonged bleeding denies.     Genitourinary:  Frequent urination denies. Pain in lower back denies. Painful urination denies.     Musculoskeletal:  See HPI for details    Skin:  Rash denies.    Neurologic:  Dizziness denies. Gait abnormalities denies. Seizures denies. Tingling/Numbess denies.    Psychiatric:  Anxiety denies. Depressed mood denies.     Objective:   Vital Signs: There were no vitals filed for this visit.     Physical Exam      General Examination:     Constitutional: The patient is alert and oriented to lace person and time. Mood is pleasant.     Head/Face: Normal facial features normal eyebrows    Eyes: Normal extraocular motion bilaterally    Lungs: Respirations are equal and unlabored    Gait is coordinated.    Cardiovascular: There are no swelling or varicosities present.    Lymphatic: Negative for adenopathy    Skin: Normal    Neurological: Level of consciousness normal. Oriented to place person and time and situation    Psychiatric: Oriented to time place person and situation      Lumbar exam: Skin to lower back clean dry and intact.  No erythema or ecchymosis.  No signs or symptoms of infection.  Neurovascularly intact  throughout bilateral lower extremities.  Negative Homans bilaterally.  Negative straight leg raise maneuver bilaterally.  She does stand erect.  She can weightbear as tolerated on bilateral lower extremities.  Nonantalgic gait.  Mildly antalgic sit to stand.  Some mild diffuse tenderness about bilateral lumbar paravertebrals.  More tender on her right versus her left.  She ambulates without an aid.  Nontender over bilateral greater trochanters.    XRAY Report/ Interpretation:  No new radiographs taken on today's clinic visit.  Did review images and report of her lumbar spine MRI which does reveal facet arthrosis at L3-4, L4-5 and L5-S1 bilaterally.    Assessment:       1. Facet arthritis of lumbar region    2. Degeneration of intervertebral disc of lumbar region with discogenic back pain        Plan:       Dai was seen today for pain.    Diagnoses and all orders for this visit:    Facet arthritis of lumbar region  -     Ambulatory referral/consult to Pain Clinic; Future    Degeneration of intervertebral disc of lumbar region with discogenic back pain  -     Ambulatory referral/consult to Pain Clinic; Future         Follow up for 2-3 month f/u - lumbar pain, MBB w/ Brandon.  This is to attest that the assistant, Dandre Lee served in the capacity as a scribe for this patient's encounter.  This is also verify that I have reviewed the patient's history and  formulated the treatment plan for this patient.  I have evaluated this patient and formulated a treatment plan for this patient visit.  The treatment plan and medical decision-making is outlined below.   Treatment options were review with her and her  today at length.  Specifically, we discussed referral to pain management for evaluation and likely treatment for her facet arthrosis with medial branch blocks with progression to rhizotomy.  A lot of their questions in regards to the procedure were answered today.  Referral was placed to   Brandon today.  We will see her back in about 3 months to see how she responds to interventional procedures with him.    Treatment options were discussed with regards to the nature of the medical condition. Conservative pain intervention and surgical options were discussed in detail. The probability of success of each separate treatment option was discussed. The patient expressed a clear understanding of the treatment options. With regards to surgery, the procedure risk, benefits, complications, and outcomes were discussed. No guarantees were given with regards to surgical outcome.   The risk of complications, morbidity, and mortality of patient management decisions have been made at the time of this visit. These are associated with the patient's problems, diagnostic procedures and treatment options. This includes the possible management options selected and those considered but not selected by the patient after shared medical decision making we discussed with the patient.     This note was created using Dragon voice recognition software that occasionally misinterpreted phrases or words.         [1]   Current Outpatient Medications   Medication Sig Dispense Refill    betamethasone valerate 0.1% (VALISONE) 0.1 % Crea Aaa bid 45 g 3    biotin 10,000 mcg Cap Take by mouth.      calcium carbonate (OS-SHAYAN) 600 mg calcium (1,500 mg) Tab Take 600 mg by mouth once.      calcium citrate-vitamin D3 500 mg-12.5 mcg (500 unit) Chew Take by mouth.      cyclobenzaprine (FLEXERIL) 10 MG tablet Take 1 tablet (10 mg total) by mouth 3 (three) times daily as needed for Muscle spasms. 90 tablet 2    denosumab (PROLIA) 60 mg/mL Syrg       dicyclomine (BENTYL) 20 mg tablet Take 20 mg by mouth 2 (two) times daily.      DULoxetine (CYMBALTA) 30 MG capsule Take 30 mg by mouth.      fluocinonide (LIDEX) 0.05 % ointment Apply to psoriasis bid x 3 weeks, take one week off 60 g 2    loratadine (CLARITIN) 10 mg tablet Take 10 mg by mouth  once daily.      melatonin 10 mg Cap Take by mouth.      mometasone 0.1% (ELOCON) 0.1 % cream Aaa qd-bid x 3 wks, tk 1 wk off then repeat for psoriasis 45 g 3    multivit-min/ferrous fumarate (MULTI VITAMIN ORAL)       multivitamin (THERAGRAN) per tablet Take 1 tablet by mouth.      polyethylene glycol 3350 (MIRALAX ORAL) Take by mouth.      risankizumab-rzaa 150 mg/mL PnIj Starting week 16 inject 1 pen SC every 12 wks 1 mL 2    sucralfate (CARAFATE) 1 gram tablet Take 1 g by mouth 4 (four) times daily.      vitamin D (VITAMIN D3) 1000 units Tab       CREON 36,000-114,000- 180,000 unit CpDR       cycloSPORINE (RESTASIS) 0.05 % ophthalmic emulsion 1 drop 2 (two) times daily.      cycloSPORINE (VEVYE) 0.1 % Drop Apply to eye. (Patient not taking: Reported on 4/30/2025)      diphenhydrAMINE (SOMINEX) 25 mg tablet Take 25 mg by mouth nightly as needed for Insomnia.      DULoxetine (CYMBALTA) 60 MG capsule Take 60 mg by mouth.      pregabalin (LYRICA) 50 MG capsule Take 50 mg by mouth 2 (two) times daily.      risankizumab-rzaa 150 mg/mL PnIj Inject 1 pen SC on weeks 0 and 4 (Patient not taking: Reported on 4/30/2025) 2 mL 0    tapinarof (VTAMA) 1 % Crea AAA thin layer qday 60 g 3     No current facility-administered medications for this visit.   [2]   Social History  Socioeconomic History    Marital status:    Tobacco Use    Smoking status: Never    Smokeless tobacco: Never   Substance and Sexual Activity    Alcohol use: No    Drug use: No     Social Drivers of Health     Financial Resource Strain: Low Risk  (9/14/2024)    Overall Financial Resource Strain (CARDIA)     Difficulty of Paying Living Expenses: Not hard at all   Food Insecurity: No Food Insecurity (9/14/2024)    Hunger Vital Sign     Worried About Running Out of Food in the Last Year: Never true     Ran Out of Food in the Last Year: Never true   Physical Activity: Inactive (9/14/2024)    Exercise Vital Sign     Days of Exercise per Week: 0 days      Minutes of Exercise per Session: 0 min   Stress: No Stress Concern Present (9/14/2024)    [employer] - Occupational Stress Questionnaire     Feeling of Stress : Not at all   Housing Stability: Unknown (9/14/2024)    Housing Stability Vital Sign     Unable to Pay for Housing in the Last Year: No

## 2025-07-31 ENCOUNTER — OFFICE VISIT (OUTPATIENT)
Dept: PAIN MEDICINE | Facility: CLINIC | Age: 70
End: 2025-07-31
Payer: MEDICARE

## 2025-07-31 VITALS
BODY MASS INDEX: 24.39 KG/M2 | HEART RATE: 70 BPM | HEIGHT: 64 IN | WEIGHT: 142.88 LBS | DIASTOLIC BLOOD PRESSURE: 73 MMHG | SYSTOLIC BLOOD PRESSURE: 115 MMHG

## 2025-07-31 DIAGNOSIS — M47.816 FACET ARTHRITIS OF LUMBAR REGION: ICD-10-CM

## 2025-07-31 DIAGNOSIS — M54.89 VERTEBROGENIC PAIN: ICD-10-CM

## 2025-07-31 DIAGNOSIS — M79.18 MYOFASCIAL PAIN: Primary | ICD-10-CM

## 2025-07-31 DIAGNOSIS — M51.360 DEGENERATION OF INTERVERTEBRAL DISC OF LUMBAR REGION WITH DISCOGENIC BACK PAIN: ICD-10-CM

## 2025-07-31 PROCEDURE — 99999 PR PBB SHADOW E&M-EST. PATIENT-LVL V: CPT | Mod: PBBFAC,,, | Performed by: STUDENT IN AN ORGANIZED HEALTH CARE EDUCATION/TRAINING PROGRAM

## 2025-07-31 PROCEDURE — 20553 NJX 1/MLT TRIGGER POINTS 3/>: CPT | Mod: PBBFAC,PN | Performed by: STUDENT IN AN ORGANIZED HEALTH CARE EDUCATION/TRAINING PROGRAM

## 2025-07-31 PROCEDURE — 99999PBSHW PR PBB SHADOW TECHNICAL ONLY FILED TO HB: Mod: JZ,PBBFAC,,

## 2025-07-31 PROCEDURE — 99215 OFFICE O/P EST HI 40 MIN: CPT | Mod: PBBFAC,PN | Performed by: STUDENT IN AN ORGANIZED HEALTH CARE EDUCATION/TRAINING PROGRAM

## 2025-07-31 RX ORDER — METHYLPREDNISOLONE ACETATE 40 MG/ML
40 INJECTION, SUSPENSION INTRA-ARTICULAR; INTRALESIONAL; INTRAMUSCULAR; SOFT TISSUE
Status: DISCONTINUED | OUTPATIENT
Start: 2025-07-31 | End: 2025-07-31 | Stop reason: HOSPADM

## 2025-07-31 RX ADMIN — METHYLPREDNISOLONE ACETATE 40 MG: 40 INJECTION, SUSPENSION INTRA-ARTICULAR; INTRALESIONAL; INTRAMUSCULAR; SOFT TISSUE at 09:07

## 2025-07-31 NOTE — PROCEDURES
Trigger Point Injection    Performed by: Jabari Taylor MD  Authorized by: Jabari Taylor MD    Lumbar Paraspinal:  Right  Pre-Procedure:   Indications:  Pain relief  Site marked: the procedure site was marked    Prep: patient was prepped and draped in usual sterile fashion     Local anesthesia used?: Yes    Anesthesia:  Local infiltration  Local anesthetic:  Bupivacaine 0.25% without epinephrine and lidocaine 2% without epinephrine  Anesthetic total (ml):  8    Medications: 40 mg methylPREDNISolone acetate 40 mg/mL  Sacral:  Right  Lumbosacral:  Right  Muscles injected after negative aspiration: right gluteus rikki, right gluteus medius, right iliocostalis

## 2025-07-31 NOTE — PROGRESS NOTES
Interventional Pain Clinic    Referred by:   Orlin Castillo MD    PCP:   Josie Thompson MD    CHIEF COMPLAINT:   R gluteal pain    HISTORY OF PRESENT ILLNESS:   Dai Cummings presents for evaluation of chronic right lumbosacral/gluteal pain.  This has been an issue for years without particular inciting event.  It waxes and wanes in terms of intensity.  She indicates the right upper gluteal region as the most prominent area of pain.  It is worst when sitting and when rising from a seated position.  It does not radiate outside of this region.  It is not associated with any paresthesias or weakness.  She has not had any invasive intervention for this pain.  She has attended PT without much change in symptoms.  She was evaluated by Dr. Castillo, who sent her here for evaluation of interventional strategies.    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     Pain Intervention History:  None in recent years    Review of patient's allergies indicates:   Allergen Reactions    Ondansetron hcl Nausea Only    Codeine     Demerol [meperidine] Nausea And Vomiting    Ondansetron Nausea Only     Past Medical History:   Diagnosis Date    Allergy     Bleeding duodenal ulcer 2007    Cancer     Endometriosis     H/O malignant gastrointestinal stromal tumor (GIST)     removed head of pancreas and duodenum     Past Surgical History:   Procedure Laterality Date    APPENDECTOMY      CATARACT EXTRACTION      both    CHOLECYSTECTOMY      DILATION AND CURETTAGE OF UTERUS      ENDOSCOPIC ULTRASOUND OF UPPER GASTROINTESTINAL TRACT N/A 12/03/2019    Procedure: ULTRASOUND, UPPER GI TRACT, ENDOSCOPIC;  Surgeon: Miguel Ángel Arroyo III, MD;  Location: [facility];  Service: Endoscopy;  Laterality: N/A;    GASTRIC BYPASS      HERNIA REPAIR      HYSTERECTOMY      KYPHOPLASTY,  "SPINE, LUMBAR  12/05/2019    L1    LAPAROSCOPIC GASTRIC BANDING      TONSILLECTOMY      WHIPPLE PROCEDURE       Social History     Occupational History    Not on file   Tobacco Use    Smoking status: Never    Smokeless tobacco: Never   Substance and Sexual Activity    Alcohol use: No    Drug use: No    Sexual activity: Not on file     Family history reviewed in the patient's chart.     PHYSICAL EXAMINATION  VITALS:   Vitals:    07/31/25 0919   BP: 115/73   Pulse: 70   Weight: 64.8 kg (142 lb 13.7 oz)   Height: 5' 4" (1.626 m)   PainSc: 1    PainLoc: Back     GENERAL: Calm, cooperative, pleasant  CHEST: No increased work of breathing  SKIN: Intact  MSK/NEURO:  Lumbar spine range of motion is full in all fields  Right lateral bending somewhat recreates her symptoms  Flexion and extension do not change her symptoms  She is not tender to palpation over the lumbar paraspinals  She is tender to palpation lateral to the PSIS on the right side with reproduction of symptoms  Strength is full in bilateral lower extremities   Sensation is intact to light touch in bilateral lower extremities   Reflexes are normal and symmetric in bilateral lower extremities   No clonus   Flexor plantar responses  Right-sided TRAVIS reproduces symptoms  Left-sided TRAVIS mildly reproduces right-sided symptoms  FADIR is negative  Sacral compression is negative  Hip thrust is negative  Straight leg raise is negative  Piriformis stretch relieved symptoms    LABS:  No recent, relevant labs    IMAGING:    MRI L-spine 2025  Impression:  1. Multilevel lumbar degenerative disc disease and facet osteoarthritis, without focal disc herniation or spinal canal stenosis at any level.  2. Remote compression fracture deformity of L1 post vertebroplasty, with no acute fractures.  3. Localized left posterior paraspinal intramuscular edema at L4-L5.    XR Pelvis 2025  Impression:  Mild degenerative changes at the hips.    XR L spine 2025  FINDINGS:  Stable chronic " mild L1 superior endplate compression fracture status post vertebral body cement augmentation.  Remainder of the vertebral body heights are maintained without evidence of acute fracture or abnormal subluxation.  Stable mild dextroconvex curvature of the lumbar spine.  Multilevel mild-to-moderate intervertebral disc space narrowing, most pronounced at L5-S1.  Moderate anterior marginal osteophyte formation at L2-3.  Lower lumbar facet arthropathy. Mild degenerative changes of the sacroiliac joints.  Surgical clips project over the mid abdomen.    ASSESSMENT:   69 y.o. year old female with right lumbosacral pain.  Differential diagnosis includes myofascial origin, symptomatic lumbar spondylosis, or symptomatic endplate inflammation.  She does have some trace facet effusions at L3-4 although her overall level of spondylosis is mild.  There is edema in the L5-S1 endplates primarily on the right side.    Encounter Diagnoses   Name Primary?    Facet arthritis of lumbar region     Degeneration of intervertebral disc of lumbar region with discogenic back pain     Myofascial pain Yes    Vertebrogenic pain      PLAN:  Discussed treatment options today including medial branch blocks, trigger point injections, BVN ablation, and observation.  The patient is a bit fearful of blocks and ablations and prefers to try the most conservative strategy first.  Trigger point injection to the right gluteal musculature was performed.  Procedure tolerated well.  See procedure note for details.  Follow up in about a month to assess efficacy.  Contact with any questions or concerns      Jabari Taylor MD  This note was completed with dictation software and grammatical/syntax errors may exist.

## 2025-08-14 ENCOUNTER — OFFICE VISIT (OUTPATIENT)
Dept: PAIN MEDICINE | Facility: CLINIC | Age: 70
End: 2025-08-14
Payer: MEDICARE

## 2025-08-14 VITALS — HEIGHT: 64 IN | WEIGHT: 142.88 LBS | BODY MASS INDEX: 24.39 KG/M2

## 2025-08-14 DIAGNOSIS — M47.816 FACET ARTHRITIS OF LUMBAR REGION: ICD-10-CM

## 2025-08-14 DIAGNOSIS — M51.360 DEGENERATION OF INTERVERTEBRAL DISC OF LUMBAR REGION WITH DISCOGENIC BACK PAIN: Primary | ICD-10-CM

## 2025-08-14 PROCEDURE — 99213 OFFICE O/P EST LOW 20 MIN: CPT | Mod: PBBFAC,PN | Performed by: STUDENT IN AN ORGANIZED HEALTH CARE EDUCATION/TRAINING PROGRAM

## 2025-08-14 PROCEDURE — 99999 PR PBB SHADOW E&M-EST. PATIENT-LVL III: CPT | Mod: PBBFAC,,, | Performed by: STUDENT IN AN ORGANIZED HEALTH CARE EDUCATION/TRAINING PROGRAM

## 2025-08-14 PROCEDURE — 99214 OFFICE O/P EST MOD 30 MIN: CPT | Mod: S$PBB,,, | Performed by: STUDENT IN AN ORGANIZED HEALTH CARE EDUCATION/TRAINING PROGRAM

## 2025-08-14 RX ORDER — DICLOFENAC POTASSIUM 50 MG/1
50 TABLET, FILM COATED ORAL 3 TIMES DAILY
COMMUNITY
End: 2025-08-14

## 2025-08-14 RX ORDER — MELOXICAM 7.5 MG/1
7.5 TABLET ORAL DAILY
Qty: 30 TABLET | Refills: 0 | Status: SHIPPED | OUTPATIENT
Start: 2025-08-14 | End: 2025-09-13

## 2025-08-14 RX ORDER — HYDROCODONE BITARTRATE AND ACETAMINOPHEN 5; 325 MG/1; MG/1
1 TABLET ORAL EVERY 6 HOURS PRN
Qty: 28 TABLET | Refills: 0 | Status: SHIPPED | OUTPATIENT
Start: 2025-08-14

## 2025-08-14 RX ORDER — MELOXICAM 7.5 MG/1
7.5 TABLET ORAL DAILY
Qty: 30 TABLET | Refills: 0 | Status: SHIPPED | OUTPATIENT
Start: 2025-08-14 | End: 2025-08-14

## 2025-08-15 ENCOUNTER — PATIENT MESSAGE (OUTPATIENT)
Dept: PAIN MEDICINE | Facility: CLINIC | Age: 70
End: 2025-08-15
Payer: MEDICARE

## 2025-08-18 ENCOUNTER — TELEPHONE (OUTPATIENT)
Dept: PAIN MEDICINE | Facility: CLINIC | Age: 70
End: 2025-08-18
Payer: MEDICARE

## 2025-08-18 DIAGNOSIS — M47.817 SPONDYLOSIS WITHOUT MYELOPATHY OR RADICULOPATHY, LUMBOSACRAL REGION: Primary | ICD-10-CM

## 2025-08-19 ENCOUNTER — OFFICE VISIT (OUTPATIENT)
Dept: URGENT CARE | Facility: CLINIC | Age: 70
End: 2025-08-19
Payer: MEDICARE

## 2025-08-19 VITALS
TEMPERATURE: 98 F | WEIGHT: 142 LBS | HEIGHT: 64 IN | OXYGEN SATURATION: 96 % | RESPIRATION RATE: 18 BRPM | DIASTOLIC BLOOD PRESSURE: 79 MMHG | HEART RATE: 77 BPM | SYSTOLIC BLOOD PRESSURE: 132 MMHG | BODY MASS INDEX: 24.24 KG/M2

## 2025-08-19 DIAGNOSIS — N30.01 ACUTE CYSTITIS WITH HEMATURIA: ICD-10-CM

## 2025-08-19 DIAGNOSIS — R30.0 DYSURIA: Primary | ICD-10-CM

## 2025-08-19 LAB
BILIRUB UR QL STRIP: NEGATIVE
GLUCOSE UR QL STRIP: NEGATIVE
KETONES UR QL STRIP: NEGATIVE
LEUKOCYTE ESTERASE UR QL STRIP: POSITIVE
PH, POC UA: 6
POC BLOOD, URINE: POSITIVE
POC NITRATES, URINE: NEGATIVE
PROT UR QL STRIP: NEGATIVE
SP GR UR STRIP: 1.01 (ref 1–1.03)
UROBILINOGEN UR STRIP-ACNC: 0.2 (ref 0.1–1.1)

## 2025-08-19 PROCEDURE — 81003 URINALYSIS AUTO W/O SCOPE: CPT | Mod: QW,S$GLB,,

## 2025-08-19 PROCEDURE — 99204 OFFICE O/P NEW MOD 45 MIN: CPT | Mod: S$GLB,,,

## 2025-08-19 RX ORDER — RISANKIZUMAB-RZAA 150 MG/ML
INJECTION SUBCUTANEOUS
COMMUNITY

## 2025-08-19 RX ORDER — CEPHALEXIN 500 MG/1
500 CAPSULE ORAL EVERY 12 HOURS
Qty: 14 CAPSULE | Refills: 0 | Status: SHIPPED | OUTPATIENT
Start: 2025-08-19 | End: 2025-08-26

## 2025-08-19 RX ORDER — CYCLOBENZAPRINE HCL 10 MG
10 TABLET ORAL NIGHTLY PRN
COMMUNITY

## 2025-08-26 DIAGNOSIS — B96.20 E. COLI UTI: Primary | ICD-10-CM

## 2025-08-26 DIAGNOSIS — Z16.24 MULTIPLE DRUG RESISTANT ORGANISM (MDRO) CULTURE POSITIVE: ICD-10-CM

## 2025-08-26 DIAGNOSIS — N39.0 E. COLI UTI: Primary | ICD-10-CM

## 2025-08-26 DIAGNOSIS — A49.9 ESBL (EXTENDED SPECTRUM BETA-LACTAMASE) PRODUCING BACTERIA INFECTION: ICD-10-CM

## 2025-08-26 DIAGNOSIS — Z16.12 ESBL (EXTENDED SPECTRUM BETA-LACTAMASE) PRODUCING BACTERIA INFECTION: ICD-10-CM

## 2025-08-26 RX ORDER — NITROFURANTOIN 25; 75 MG/1; MG/1
100 CAPSULE ORAL 2 TIMES DAILY
Qty: 10 CAPSULE | Refills: 0 | Status: SHIPPED | OUTPATIENT
Start: 2025-08-26 | End: 2025-08-31